# Patient Record
Sex: FEMALE | Race: WHITE | Employment: UNEMPLOYED | URBAN - NONMETROPOLITAN AREA
[De-identification: names, ages, dates, MRNs, and addresses within clinical notes are randomized per-mention and may not be internally consistent; named-entity substitution may affect disease eponyms.]

---
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
## 2022-04-10 ENCOUNTER — HOSPITAL ENCOUNTER (EMERGENCY)
Age: 22
Discharge: HOME | End: 2022-04-10
Payer: MEDICAID

## 2022-04-10 VITALS
OXYGEN SATURATION: 98 % | SYSTOLIC BLOOD PRESSURE: 99 MMHG | RESPIRATION RATE: 15 BRPM | DIASTOLIC BLOOD PRESSURE: 71 MMHG | HEART RATE: 108 BPM | TEMPERATURE: 98.4 F | WEIGHT: 97 LBS

## 2022-04-10 DIAGNOSIS — N39.0 URINARY TRACT INFECTION WITHOUT HEMATURIA, SITE UNSPECIFIED: ICD-10-CM

## 2022-04-10 DIAGNOSIS — N89.8 VAGINAL DISCHARGE: Primary | ICD-10-CM

## 2022-04-10 LAB
BACTERIA WET PREP: NORMAL
BACTERIA: NEGATIVE /HPF
BILIRUBIN URINE: NEGATIVE
BLOOD, URINE: NEGATIVE
CLARITY: ABNORMAL
CLUE CELLS: NORMAL
COLOR: ABNORMAL
CRYSTALS, UA: ABNORMAL /HPF
EPITHELIAL CELLS WET PREP: NORMAL
EPITHELIAL CELLS, UA: 1 /HPF (ref 0–5)
GLUCOSE URINE: NEGATIVE MG/DL
HCG(URINE) PREGNANCY TEST: NEGATIVE
HYALINE CASTS: 0 /HPF (ref 0–8)
KETONES, URINE: ABNORMAL MG/DL
LEUKOCYTE ESTERASE, URINE: ABNORMAL
NITRITE, URINE: NEGATIVE
PH UA: 8.5 (ref 5–8)
PROTEIN UA: ABNORMAL MG/DL
RBC UA: 4 /HPF (ref 0–4)
RBC WET PREP: NORMAL
SOURCE WET PREP: NORMAL
SPECIFIC GRAVITY UA: 1.02 (ref 1–1.03)
TRICHOMONAS PREP: NORMAL
UROBILINOGEN, URINE: 2 E.U./DL
WBC UA: 189 /HPF (ref 0–5)
WBC WET PREP: NORMAL
YEAST WET PREP: NORMAL

## 2022-04-10 PROCEDURE — 87086 URINE CULTURE/COLONY COUNT: CPT

## 2022-04-10 PROCEDURE — 6360000002 HC RX W HCPCS: Performed by: PHYSICIAN ASSISTANT

## 2022-04-10 PROCEDURE — 96372 THER/PROPH/DIAG INJ SC/IM: CPT

## 2022-04-10 PROCEDURE — 87661 TRICHOMONAS VAGINALIS AMPLIF: CPT

## 2022-04-10 PROCEDURE — 99283 EMERGENCY DEPT VISIT LOW MDM: CPT

## 2022-04-10 PROCEDURE — 81001 URINALYSIS AUTO W/SCOPE: CPT

## 2022-04-10 PROCEDURE — 84703 CHORIONIC GONADOTROPIN ASSAY: CPT

## 2022-04-10 PROCEDURE — 87591 N.GONORRHOEAE DNA AMP PROB: CPT

## 2022-04-10 PROCEDURE — 87491 CHLMYD TRACH DNA AMP PROBE: CPT

## 2022-04-10 RX ORDER — CEFTRIAXONE 1 G/1
500 INJECTION, POWDER, FOR SOLUTION INTRAMUSCULAR; INTRAVENOUS ONCE
Status: COMPLETED | OUTPATIENT
Start: 2022-04-10 | End: 2022-04-10

## 2022-04-10 RX ORDER — DOXYCYCLINE HYCLATE 100 MG
100 TABLET ORAL 2 TIMES DAILY
Qty: 14 TABLET | Refills: 0 | Status: SHIPPED | OUTPATIENT
Start: 2022-04-10 | End: 2022-04-17

## 2022-04-10 RX ADMIN — CEFTRIAXONE 500 MG: 1 INJECTION, POWDER, FOR SOLUTION INTRAMUSCULAR; INTRAVENOUS at 21:40

## 2022-04-10 NOTE — Clinical Note
Bipin Triplett Zaks was seen and treated in our emergency department on 4/10/2022. She may return to work on 04/13/2022. If you have any questions or concerns, please don't hesitate to call.       Shadi Payne

## 2022-04-11 LAB
C. TRACHOMATIS DNA ,URINE: POSITIVE
N. GONORRHOEAE DNA, URINE: NEGATIVE
TRICHOMONAS VAGINALIS DNA, URINE: POSITIVE

## 2022-04-11 ASSESSMENT — ENCOUNTER SYMPTOMS
BACK PAIN: 0
CONSTIPATION: 0
ABDOMINAL PAIN: 0
DIARRHEA: 0
VOMITING: 0
SHORTNESS OF BREATH: 0
NAUSEA: 0

## 2022-04-11 NOTE — ED PROVIDER NOTES
Patient came back positive for chlamydia and trichomonas. She is taking doxy appropriate oral outpatient for chlamydia. I have called 1 time loading dose 2000mg of flagyl for trich. She is abstaining until asymptomatic and will notify partner.        Brigette Connelly [place]  04/11/22 3165

## 2022-04-11 NOTE — ED NOTES
Pt resting comfortably at this time.   Waiting on Re-eval/possible pelvic exam.      Grace Callejas RN  04/10/22 2038

## 2022-04-11 NOTE — ED PROVIDER NOTES
[facility] EMERGENCY DEPT  eMERGENCY dEPARTMENT eNCOUnter      Pt Name: Nan Cervantes  MRN: [id number]  [place] 2000  Date of evaluation: 4/10/2022  Provider: Vitor Dickens, [address] [place] [address]       Chief Complaint   Patient presents with    Vaginal Discharge     green discharge x2 days          HISTORY OF PRESENT ILLNESS   (Location/Symptom, Timing/Onset,Context/Setting, Quality, Duration, Modifying Factors, Severity)  Note limiting factors. Nan Cervantes is a  25 y.o. female who denies medical history who presents to the emergency department with vaginal discharge over the last 2 days. She admits to a foul-smelling green discharge. She states the discharge is copious. The patient is currently sexually active with multiple partners. She denies any known pregnancy. She denies any vaginal bleeding. She does admit to dysuria that started today. She denies any associated hematuria, frequency changes, or abdominal pain. She denies any CVA or flank pain. HPI    NursingNotes were reviewed. REVIEW OF SYSTEMS    (2-9 systems for level 4, 10 or more for level 5)     Review of Systems   Constitutional: Negative for chills and fever. Respiratory: Negative for shortness of breath. Cardiovascular: Negative for chest pain. Gastrointestinal: Negative for abdominal pain, constipation, diarrhea, nausea and vomiting. Genitourinary: Positive for dysuria (started today) and vaginal discharge. Negative for flank pain, frequency, hematuria, pelvic pain, vaginal bleeding and vaginal pain. Musculoskeletal: Negative for back pain. All other systems reviewed and are negative. PAST MEDICALHISTORY   History reviewed. No pertinent past medical history. SURGICAL HISTORY     History reviewed. No pertinent surgical history. CURRENT MEDICATIONS     Discharge Medication List as of 4/10/2022 10:17 PM          ALLERGIES     Patient has no known allergies. FAMILY HISTORY     History reviewed. No pertinent family history. SOCIAL HISTORY       Social History     Socioeconomic History    Marital status: Single     Spouse name: None    Number of children: None    Years of education: None    Highest education level: None   Occupational History    None   Tobacco Use    Smoking status: Never Smoker    Smokeless tobacco: Never Used   Vaping Use    Vaping Use: Every day   Substance and Sexual Activity    Alcohol use: Not Currently    Drug use: Never    Sexual activity: Yes   Other Topics Concern    None   Social History Narrative    None     Social Determinants of Health     Financial Resource Strain:     Difficulty of Paying Living Expenses: Not on file   Food Insecurity:     Worried About Running Out of Food in the Last Year: Not on file    Jas of Food in the Last Year: Not on file   Transportation Needs:     Lack of Transportation (Medical): Not on file    Lack of Transportation (Non-Medical):  Not on file   Physical Activity:     Days of Exercise per Week: Not on file    Minutes of Exercise per Session: Not on file   Stress:     Feeling of Stress : Not on file   Social Connections:     Frequency of Communication with Friends and Family: Not on file    Frequency of Social Gatherings with Friends and Family: Not on file    Attends Rastafarian Services: Not on file    Active Member of [address] [place] [address] or Organizations: Not on file    Attends Club or Organization Meetings: Not on file    Marital Status: Not on file   Intimate Partner Violence:     Fear of Current or Ex-Partner: Not on file    Emotionally Abused: Not on file    Physically Abused: Not on file    Sexually Abused: Not on file   Housing Stability:     Unable to Pay for Housing in the Last Year: Not on file    Number of [place] in the Last Year: Not on file    Unstable Housing in the Last Year: Not on file       SCREENINGS    Windy Coma Scale  Eye Opening: Spontaneous  Best Verbal Response: Oriented  Best Motor Response: Obeys commands  Windy Coma Scale Score: 15        PHYSICAL EXAM    (up to 7 for level 4, 8 or more for level 5)     ED Triage Vitals [04/10/22 1722]   BP Temp Temp src Pulse Resp SpO2 Height Weight   99/71 98.4 °F (36.9 °C) -- 108 15 98 % -- 97 lb (44 kg)       Physical Exam  Vitals and nursing note reviewed. Exam conducted with a chaperone present (Nurse Mali Oswald). Constitutional:       General: She is not in acute distress. Appearance: Normal appearance. She is not ill-appearing or toxic-appearing. Comments: underweight   Cardiovascular:      Rate and Rhythm: Normal rate and regular rhythm. Pulses: Normal pulses. Pulmonary:      Effort: Pulmonary effort is normal. No respiratory distress. Abdominal:      General: There is no distension. Palpations: Abdomen is soft. There is no mass. Tenderness: There is no abdominal tenderness. There is no right CVA tenderness or left CVA tenderness. Genitourinary:     Exam position: Knee-chest position. Labia:         Right: No rash, tenderness, lesion or injury. Left: No rash, tenderness, lesion or injury. Urethra: No urethral pain, urethral swelling or urethral lesion. Vagina: No signs of injury and foreign body. Vaginal discharge present. No erythema, tenderness or lesions. Cervix: Discharge present. No cervical motion tenderness, friability, erythema or cervical bleeding. Uterus: Not enlarged and not tender. Adnexa: Right adnexa normal and left adnexa normal.        Right: No mass, tenderness or fullness. Left: No mass, tenderness or fullness. Comments: Copious green discharge  Skin:     General: Skin is warm and dry. Neurological:      General: No focal deficit present. Mental Status: She is alert and oriented to person, place, and time.          DIAGNOSTIC RESULTS     LABS:  Labs Reviewed   URINALYSIS WITH REFLEX TO CULTURE - Abnormal; Notable for the following components:       Result Value    Color, UA DARK YELLOW (*)     Clarity, UA CLOUDY (*)     Ketones, Urine TRACE (*)     pH, UA 8.5 (*)     Protein, UA TRACE (*)     Urobilinogen, Urine 2.0 (*)     Leukocyte Esterase, Urine LARGE (*)     All other components within normal limits   MICROSCOPIC URINALYSIS - Abnormal; Notable for the following components:    Bacteria, UA NEGATIVE (*)     Crystals, UA NEG (*)     WBC,  (*)     All other components within normal limits   WET PREP, GENITAL   CHLAMYDIA/N. GONORRHOEAE/T. VAGINALIS, AMPLIFIED PROBE(UR)   CULTURE, URINE   PREGNANCY, URINE       All other labs were within normal range or not returned as of this dictation. EMERGENCY DEPARTMENT COURSE and DIFFERENTIAL DIAGNOSIS/MDM:   Vitals:    Vitals:    04/10/22 1722   BP: 99/71   Pulse: 108   Resp: 15   Temp: 98.4 °F (36.9 °C)   SpO2: 98%   Weight: 97 lb (44 kg)       MDM  Patient is a 19-year-old female presents the ER with vaginal discharge. On physical exam, she does have green copious thin discharge. She does not have any associated abdominal pain, pain on  exam, or vaginal pain that would warrant ultrasound imaging at this time. hCG is negative. Her urinalysis does show some signs of infection and since the patient is having dysuria, the antibiotic she will be given will cover for UTI. Based on the patient having multiple partners and a suspected STI exposure, I will go ahead and treat prophylactically for both chlamydia and gonorrhea. She was given a dose of Rocephin in the ER. She also be sent home with a course of doxycycline. Her wet prep does not show any clue cells or trichomonas warranting course of Flagyl. STI is supported by a large amount of white cells on wet prep. At this time, the patient is safe to be discharged. I have stressed with her avoiding any sexual contact for 1 week and retesting if positive.   I have also stressed follow-up with OB/GYN or her primary care provider to ensure

## 2022-04-11 NOTE — ED NOTES
[facility], [address] and Claribel ojeda, RN at bedside to perform pevlic exam.  Pt tolerated well.       Davey Foley, MED  04/10/22 9126

## 2022-04-12 LAB — URINE CULTURE, ROUTINE: NORMAL

## 2022-11-09 ENCOUNTER — HOSPITAL ENCOUNTER (EMERGENCY)
Facility: HOSPITAL | Age: 22
Discharge: HOME | End: 2022-11-09
Attending: STUDENT IN AN ORGANIZED HEALTH CARE EDUCATION/TRAINING PROGRAM | Admitting: STUDENT IN AN ORGANIZED HEALTH CARE EDUCATION/TRAINING PROGRAM

## 2022-11-09 ENCOUNTER — APPOINTMENT (OUTPATIENT)
Dept: GENERAL RADIOLOGY | Facility: HOSPITAL | Age: 22
End: 2022-11-09

## 2022-11-09 VITALS
BODY MASS INDEX: 18.33 KG/M2 | WEIGHT: 110 LBS | HEART RATE: 74 BPM | HEIGHT: 65 IN | RESPIRATION RATE: 19 BRPM | DIASTOLIC BLOOD PRESSURE: 70 MMHG | OXYGEN SATURATION: 99 % | TEMPERATURE: 98.5 F | SYSTOLIC BLOOD PRESSURE: 114 MMHG

## 2022-11-09 DIAGNOSIS — M79.641 RIGHT HAND PAIN: ICD-10-CM

## 2022-11-09 DIAGNOSIS — S66.911A STRAIN OF RIGHT HAND, INITIAL ENCOUNTER: Primary | ICD-10-CM

## 2022-11-09 PROCEDURE — 73110 X-RAY EXAM OF WRIST: CPT

## 2022-11-09 PROCEDURE — 73130 X-RAY EXAM OF HAND: CPT

## 2022-11-09 PROCEDURE — 99283 EMERGENCY DEPT VISIT LOW MDM: CPT

## 2022-11-09 RX ORDER — ACETAMINOPHEN 325 MG/1
650 TABLET ORAL EVERY 6 HOURS PRN
Qty: 80 TABLET | Refills: 0 | Status: SHIPPED | OUTPATIENT
Start: 2022-11-09 | End: 2022-11-19

## 2022-11-09 RX ORDER — IBUPROFEN 400 MG/1
400 TABLET ORAL ONCE
Status: COMPLETED | OUTPATIENT
Start: 2022-11-09 | End: 2022-11-09

## 2022-11-09 RX ORDER — ACETAMINOPHEN 500 MG
1000 TABLET ORAL ONCE
Status: COMPLETED | OUTPATIENT
Start: 2022-11-09 | End: 2022-11-09

## 2022-11-09 RX ORDER — IBUPROFEN 600 MG/1
600 TABLET ORAL EVERY 6 HOURS PRN
Qty: 40 TABLET | Refills: 0 | Status: SHIPPED | OUTPATIENT
Start: 2022-11-09 | End: 2022-11-19

## 2022-11-09 RX ADMIN — IBUPROFEN 400 MG: 400 TABLET, FILM COATED ORAL at 21:09

## 2022-11-09 RX ADMIN — ACETAMINOPHEN 1000 MG: 500 TABLET ORAL at 21:08

## 2022-11-10 NOTE — DISCHARGE INSTRUCTIONS
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  Please follow-up with a primary care provider within 2 days for further management.  I listed a primary care providers with that she can follow-up.  If your symptoms worsen please return to the emergency department immediately.

## 2022-11-10 NOTE — ED PROVIDER NOTES
"EMERGENCY DEPARTMENT HISTORY AND PHYSICAL EXAM    Patient Name: Eva Amaya    Chief Complaint   Patient presents with   • Hand Pain       History of Presenting Illness:  Eva Amaya is a 22 y.o. female with no sniffing past medical history presents to the emergency department due to right hand pain.    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  She noticed some pain to her right lateral hand near the palmar surface.  No inciting trauma but she states she was frequently lifting things today and that is what makes it worse.  Denies any significant wrist pain associated.  No cuts to her hand.  Has never had similar symptoms in the past.  Is not taking any medicines for the pain.      Past Medical History:   Past Medical History:   Diagnosis Date   • Chlamydia     during this pregnancy   • Gonorrhea     during this pregnancy       Past Surgical History:   No prior surgeries    Social History:   Denies tobacco  Denies EtOH  Denies marijuana, cocaine, or IV drugs    Allergies:   No Known Allergies    Medications:   No current facility-administered medications for this encounter.    Current Outpatient Medications:   •  Prenatal Vit-Fe Fumarate-FA (prenatal vitamin 28-0.8) 28-0.8 MG tablet tablet, Take 1 tablet by mouth Daily., Disp: 30 tablet, Rfl: 12    Review of Systems:  A full review of systems was obtained and is negative unless otherwise stated in HPI.    Physical Exam:   VS: /70 (BP Location: Right arm, Patient Position: Sitting)   Pulse 85   Temp 98.5 °F (36.9 °C) (Oral)   Resp 16   Ht 165.1 cm (65\")   Wt 49.9 kg (110 lb)   LMP 10/26/2022 (Approximate)   SpO2 100%   BMI 18.30 kg/m²   GENERAL: Well-appearing woman sitting up in stretcher no acute distress; well nourished, well developed, awake, alert, no acute distress, nontoxic appearing, comfortable  MUSCULOSKELETAL/EXTREMITIES: Tenderness elicited over the fifth metatarsal mild tenderness no significant swelling; no scaphoid " tenderness; otherwise all extremities without obvious deformity, no cyanosis or clubbing  SKIN: warm and dry with no obvious rashes  NEUROLOGIC: Full motor and sensory function of the radial median and ulnar nerve territories the right hand compared to the left  PSYCHIATRIC: alert, pleasant and cooperative. Appropriate mood and affect.      Radiology:   XR Hand 3+ View Right   Final Result   No acute findings.       This report was finalized on 11/09/2022 21:15 by Dr. Denny Molina MD.      XR Wrist 3+ View Right   Final Result   No acute bony abnormality.       This report was finalized on 11/09/2022 21:15 by Dr. Denny Molina MD.            Medical Decision Making:  Eva Amaya is a 22 y.o. female with no sniffing past medical history who presents emerged department with right hand pain after recently starting a new job.    Vital signs reassuring.  Exam unremarkable.    Imaging was ordered and reviewed.  X-ray of the right hand and wrist with no acute findings.    Nursing notes were reviewed.    The patient was given Tylenol and ibuprofen for pain.    Patient's presentation is most consistent with likely musculoskeletal strain of her right hand in setting of her recent overuse in the setting of her recent starting of her job.  Her exam has no significant tenderness.  No evidence of fractures on x-ray.  No other trauma reported.    Patient was discharged home with plan to follow-up with primary care provider within 2 days for further management return the emergency department immediately symptoms worsen.  Instructed to take anti-inflammatories to help with her pain and symptoms.      ED Diagnosis:  Strain of right hand, initial encounter; Right hand pain      Disposition: to home    Follow up plan: PCP follow up within 2 days, return to ED immediately if symptoms worsen        Signed:  Wilbur Casiano MD  Emergency Medicine Physician    Please note that portions of this note were completed with a voice  recognition program.      Wilbur Casiano MD  11/09/22 [id number]

## 2023-08-20 ENCOUNTER — APPOINTMENT (OUTPATIENT)
Dept: GENERAL RADIOLOGY | Facility: HOSPITAL | Age: 23
End: 2023-08-20
Payer: MEDICAID

## 2023-08-20 ENCOUNTER — HOSPITAL ENCOUNTER (EMERGENCY)
Facility: HOSPITAL | Age: 23
Discharge: HOME | End: 2023-08-20
Attending: FAMILY MEDICINE | Admitting: FAMILY MEDICINE
Payer: MEDICAID

## 2023-08-20 VITALS
OXYGEN SATURATION: 99 % | BODY MASS INDEX: 17.33 KG/M2 | HEIGHT: 65 IN | TEMPERATURE: 100 F | DIASTOLIC BLOOD PRESSURE: 60 MMHG | WEIGHT: 104 LBS | SYSTOLIC BLOOD PRESSURE: 99 MMHG | RESPIRATION RATE: 18 BRPM | HEART RATE: 93 BPM

## 2023-08-20 DIAGNOSIS — U07.1 COVID-19: Primary | ICD-10-CM

## 2023-08-20 LAB
FLUAV RNA RESP QL NAA+PROBE: NOT DETECTED
FLUBV RNA RESP QL NAA+PROBE: NOT DETECTED
SARS-COV-2 RNA RESP QL NAA+PROBE: DETECTED

## 2023-08-20 PROCEDURE — 87636 SARSCOV2 & INF A&B AMP PRB: CPT | Performed by: FAMILY MEDICINE

## 2023-08-20 PROCEDURE — 71046 X-RAY EXAM CHEST 2 VIEWS: CPT

## 2023-08-20 PROCEDURE — 99283 EMERGENCY DEPT VISIT LOW MDM: CPT

## 2023-08-20 RX ORDER — ACETAMINOPHEN 500 MG
1000 TABLET ORAL ONCE
Status: COMPLETED | OUTPATIENT
Start: 2023-08-20 | End: 2023-08-20

## 2023-08-20 RX ORDER — IBUPROFEN 800 MG/1
800 TABLET ORAL ONCE
Status: COMPLETED | OUTPATIENT
Start: 2023-08-20 | End: 2023-08-20

## 2023-08-20 RX ADMIN — IBUPROFEN 800 MG: 800 TABLET, FILM COATED ORAL at 16:47

## 2023-08-20 RX ADMIN — ACETAMINOPHEN 1000 MG: 500 TABLET, FILM COATED ORAL at 16:47

## 2023-08-20 NOTE — ED PROVIDER NOTES
Subjective   History of Present Illness  23-year-old female comes in with feeling like she has had a fever since last night.  Patient also has cough and congestion.  Patient has no shortness of breath.  Patient has no body aches.  Patient has no nausea vomiting or diarrhea.  Patient denies any other symptoms at this time.    Review of Systems   Constitutional:  Positive for fever.   HENT:  Positive for congestion.    Respiratory:  Positive for cough.    All other systems reviewed and are negative.    Past Medical History:   Diagnosis Date    Chlamydia     during this pregnancy    Gonorrhea     during this pregnancy       No Known Allergies    History reviewed. No pertinent surgical history.    Family History   Problem Relation Age of Onset    Lung disease Mother     No Known Problems Father     Lung disease Brother     Kidney failure Maternal Grandmother     Cancer Maternal Grandfather     Diabetes Paternal Grandmother     Lung disease Paternal Grandfather        Social History     Socioeconomic History    Marital status: Single   Tobacco Use    Smoking status: Never    Smokeless tobacco: Never   Vaping Use    Vaping Use: Former   Substance and Sexual Activity    Alcohol use: No    Drug use: No    Sexual activity: Never           Objective   Physical Exam  Vitals and nursing note reviewed.   Constitutional:       Appearance: Normal appearance.   HENT:      Head: Normocephalic and atraumatic.      Mouth/Throat:      Mouth: Mucous membranes are moist.   Eyes:      Extraocular Movements: Extraocular movements intact.      Pupils: Pupils are equal, round, and reactive to light.   Cardiovascular:      Rate and Rhythm: Normal rate and regular rhythm.      Heart sounds: Normal heart sounds.   Pulmonary:      Effort: Pulmonary effort is normal.      Breath sounds: Normal breath sounds.   Skin:     General: Skin is warm and dry.   Neurological:      General: No focal deficit present.      Mental Status: She is alert and  oriented to person, place, and time.   Psychiatric:         Mood and Affect: Mood normal.         Behavior: Behavior normal.       Procedures           ED Course                                         Lab Results (last 24 hours)       Procedure Component Value Units Date/Time    COVID-19 and FLU A/B PCR - Swab, Nasopharynx [[id number]]  (Abnormal) Collected: 08/20/23 1649    Specimen: Swab from Nasopharynx Updated: 08/20/23 1756     COVID19 Detected     Influenza A PCR Not Detected     Influenza B PCR Not Detected    Narrative:      Fact sheet for providers: https://www.fda.gov/media/446952/download    Fact sheet for patients: https://www.fda.gov/media/837892/download    Test performed by PCR.            XR Chest 2 View   Final Result   1. No radiographic evidence of acute cardiopulmonary process. No   visualized infiltrate.   This report was finalized on 08/20/2023 17:02 by Dr Carloz Thomas, .          Medical Decision Making  23-year-old female presents emergency room with fever, cough, congestion.  Patient has tested positive for COVID-19.  Patient is in no respiratory distress.  Patient's x-ray was negative for any cardiopulmonary process.  Patient be discharged home in stable condition.  Return precautions were given to the patient.  All questions were answered for the patient prior to discharge.  Patient was advised to return to the emergency room with new or worsening symptoms.  Patient verbalized understanding was agreeable plan as discussed.    Problems Addressed:  COVID-19: complicated acute illness or injury    Amount and/or Complexity of Data Reviewed  Radiology: ordered. Decision-making details documented in ED Course.    Risk  OTC drugs.  Prescription drug management.        Final diagnoses:   COVID-19       ED Disposition  ED Disposition       ED Disposition   Discharge    Condition   Stable    Comment   --               PATIENT CONNECTION - [facility] [id number]  [phone]        [facility]  [facility] EMERGENCY DEPARTMENT  [address] [place] [id number] [phone]    As needed, If symptoms worsen         Medication List      No changes were made to your prescriptions during this visit.            Adi Florez MD  08/20/23 9014

## 2023-11-20 ENCOUNTER — HOSPITAL ENCOUNTER (EMERGENCY)
Facility: HOSPITAL | Age: 23
Discharge: HOME | End: 2023-11-20
Admitting: EMERGENCY MEDICINE
Payer: MEDICAID

## 2023-11-20 VITALS
OXYGEN SATURATION: 100 % | BODY MASS INDEX: 18.74 KG/M2 | HEART RATE: 60 BPM | WEIGHT: 112.5 LBS | SYSTOLIC BLOOD PRESSURE: 105 MMHG | HEIGHT: 65 IN | DIASTOLIC BLOOD PRESSURE: 59 MMHG | RESPIRATION RATE: 18 BRPM | TEMPERATURE: 97.8 F

## 2023-11-20 DIAGNOSIS — K02.9 PAIN DUE TO DENTAL CARIES: Primary | ICD-10-CM

## 2023-11-20 PROCEDURE — 99282 EMERGENCY DEPT VISIT SF MDM: CPT

## 2023-11-20 PROCEDURE — 96372 THER/PROPH/DIAG INJ SC/IM: CPT

## 2023-11-20 PROCEDURE — 25010000002 KETOROLAC TROMETHAMINE PER 15 MG: Performed by: EMERGENCY MEDICINE

## 2023-11-20 PROCEDURE — 25010000002 DEXAMETHASONE PER 1 MG

## 2023-11-20 RX ORDER — AMOXICILLIN AND CLAVULANATE POTASSIUM 875; 125 MG/1; MG/1
1 TABLET, FILM COATED ORAL 2 TIMES DAILY
Qty: 20 TABLET | Refills: 0 | Status: SHIPPED | OUTPATIENT
Start: 2023-11-20 | End: 2023-11-20

## 2023-11-20 RX ORDER — KETOROLAC TROMETHAMINE 15 MG/ML
15 INJECTION, SOLUTION INTRAMUSCULAR; INTRAVENOUS ONCE
Status: DISCONTINUED | OUTPATIENT
Start: 2023-11-20 | End: 2023-11-20

## 2023-11-20 RX ORDER — KETOROLAC TROMETHAMINE 10 MG/1
10 TABLET, FILM COATED ORAL EVERY 6 HOURS PRN
Qty: 20 TABLET | Refills: 0 | Status: SHIPPED | OUTPATIENT
Start: 2023-11-20 | End: 2023-11-25

## 2023-11-20 RX ORDER — KETOROLAC TROMETHAMINE 10 MG/1
10 TABLET, FILM COATED ORAL EVERY 6 HOURS PRN
Qty: 20 TABLET | Refills: 0 | Status: SHIPPED | OUTPATIENT
Start: 2023-11-20 | End: 2023-11-20

## 2023-11-20 RX ORDER — AMOXICILLIN AND CLAVULANATE POTASSIUM 875; 125 MG/1; MG/1
1 TABLET, FILM COATED ORAL 2 TIMES DAILY
Qty: 20 TABLET | Refills: 0 | Status: SHIPPED | OUTPATIENT
Start: 2023-11-20 | End: 2023-11-30

## 2023-11-20 RX ORDER — KETOROLAC TROMETHAMINE 15 MG/ML
15 INJECTION, SOLUTION INTRAMUSCULAR; INTRAVENOUS ONCE
Status: COMPLETED | OUTPATIENT
Start: 2023-11-20 | End: 2023-11-20

## 2023-11-20 RX ORDER — DEXAMETHASONE SODIUM PHOSPHATE 10 MG/ML
10 INJECTION INTRAMUSCULAR; INTRAVENOUS ONCE
Status: COMPLETED | OUTPATIENT
Start: 2023-11-20 | End: 2023-11-20

## 2023-11-20 RX ADMIN — KETOROLAC TROMETHAMINE 15 MG: 15 INJECTION, SOLUTION INTRAMUSCULAR; INTRAVENOUS at 19:35

## 2023-11-20 RX ADMIN — DEXAMETHASONE SODIUM PHOSPHATE 10 MG: 10 INJECTION INTRAMUSCULAR; INTRAVENOUS at 19:34

## 2023-11-21 NOTE — DISCHARGE INSTRUCTIONS
It was very nice to meet you, Eva. Thank you for allowing us to take care of you today at [facility].    Today you were seen in the emergency department for your symptoms. Please understand that an ER evaluation is just the start of your evaluation. We do the best we can, but we are often unable to fully find what is causing your symptoms from one evaluation.  Because of this, the goal is to determine whether you need to be evaluated in the hospital or if it is safe for you to go home and see other doctors provided such as primary care physicians or specialist on an outpatient basis.     Like we discussed, I strongly urge that you follow up with your primary care doctor. Please call their office to set up an appointment as soon as possible so that you can be re-evaluated for improvement in your symptoms or for any other questions.  I have provided the primary care provider information needed, including phone number, to call to set up an appointment below in these discharge papers.     Educational material has also been provided in the following pages regarding what we have discussed today.     MEDICATIONS PRESCRIBED: Augmentin twice a day for the next 10 days. Toradol as needed for pain relief. OTC benzocaine spray.    Please return to the emergency room within 12-48 hours if you experience symptoms such as the following:   Fever, chills, chest pain or shortness of breath, pain with inspiration/expiration, pain that travels to your arms, neck or back, nausea, vomiting, severe headache, tearing pain in your chest, dizziness, feel as though you are about to pass out, OR if you have any worsening symptoms, or any other concerns.

## 2023-11-21 NOTE — ED PROVIDER NOTES
"Subjective   History of Present Illness  Patient is a 23-year-old female that presents to the emergency department for dental pain.  Patient reports she has had multiple dental issues since 2020 when she delivered her son due to \"him taking all of her vitamins out of her body \".  She states that she has not tried to seek treatment at a dentist before.  She states that she has been experiencing an increase in pain to the left lower back of her mouth has been ongoing for the last 5 days.  She states that she began noticing some minor swelling over the last 2 days to the left side of her face.  Patient denies any fevers, body aches, or chills.  She denies any shortness of breath, or difficulty swallowing.       Review of Systems   HENT:  Positive for dental problem and facial swelling.    All other systems reviewed and are negative.      Past Medical History:   Diagnosis Date    Chlamydia     during this pregnancy    Gonorrhea     during this pregnancy       No Known Allergies    History reviewed. No pertinent surgical history.    Family History   Problem Relation Age of Onset    Lung disease Mother     No Known Problems Father     Lung disease Brother     Kidney failure Maternal Grandmother     Cancer Maternal Grandfather     Diabetes Paternal Grandmother     Lung disease Paternal Grandfather        Social History     Socioeconomic History    Marital status: Single   Tobacco Use    Smoking status: Never    Smokeless tobacco: Never   Vaping Use    Vaping Use: Former   Substance and Sexual Activity    Alcohol use: No    Drug use: No    Sexual activity: Never           Objective   Physical Exam  Vitals and nursing note reviewed.   Constitutional:       Appearance: Normal appearance.      Comments: Nontoxic appearing. In no acute distress.    HENT:      Head: Normocephalic and atraumatic.      Jaw: There is normal jaw occlusion.      Right Ear: External ear normal.      Left Ear: External ear normal.      Nose: Nose " normal.      Mouth/Throat:      Lips: Pink.      Mouth: Mucous membranes are moist.      Dentition: Dental tenderness, dental caries and dental abscesses present.      Pharynx: Oropharynx is clear. Uvula midline. No pharyngeal swelling or posterior oropharyngeal erythema.      Tonsils: No tonsillar exudate or tonsillar abscesses.      Comments: Multiple dental caries noted throughout upper and lower mouth.  Patient does have localized small amount of swelling noted to the left jaw.  No erythema noted.  The affected area is not warm to touch.  Eyes:      Extraocular Movements: Extraocular movements intact.      Conjunctiva/sclera: Conjunctivae normal.      Pupils: Pupils are equal, round, and reactive to light.   Cardiovascular:      Rate and Rhythm: Normal rate and regular rhythm.      Pulses: Normal pulses.      Heart sounds: Normal heart sounds.   Pulmonary:      Effort: Pulmonary effort is normal. No respiratory distress.      Breath sounds: Normal breath sounds. No wheezing.   Chest:      Chest wall: No tenderness.   Abdominal:      General: Abdomen is flat. Bowel sounds are normal. There is no distension.      Palpations: Abdomen is soft.      Tenderness: There is no abdominal tenderness. There is no right CVA tenderness, left CVA tenderness, guarding or rebound.   Musculoskeletal:         General: Normal range of motion.      Cervical back: Normal range of motion and neck supple.      Right lower leg: No edema.      Left lower leg: No edema.   Skin:     General: Skin is warm and dry.      Capillary Refill: Capillary refill takes less than 2 seconds.   Neurological:      General: No focal deficit present.      Mental Status: She is alert and oriented to person, place, and time. Mental status is at baseline.   Psychiatric:         Mood and Affect: Mood normal.         Behavior: Behavior normal.         Thought Content: Thought content normal.         Judgment: Judgment normal.         Procedures           ED  "Course                                           Medical Decision Making  Eva Amaya is a 23 y.o. female who presents to the ED for dental pain.  Patient reports she has had multiple dental issues since 2020 when she delivered her son due to \"him taking all of her vitamins out of her body \".  She states that she has not tried to seek treatment at a dentist before.  She states that she has been experiencing an increase in pain to the left lower back of her mouth has been ongoing for the last 5 days.  She states that she began noticing some minor swelling over the last 2 days to the left side of her face.  Patient denies any fevers, body aches, or chills.  She denies any shortness of breath, or difficulty swallowing.     Patient was non-toxic appearing on arrival. No acute distress was noted.  Vital signs stable.     Past medical history, surgical history, and medication regimen reviewed.     Patient's presentation raises suspicion for differentials including, but not limited to, dental fracture, dental abscess, dental caries.    Medications  dexAMETHasone (DECADRON) injection 10 mg (10 mg Intramuscular Given 11/20/23 1934)  ketorolac (TORADOL) injection 15 mg (15 mg Intramuscular Given 11/20/23 1935)     Given findings described above, patient's presentation is likely consistent with dental abscess.    I had an in-depth discussion with the patient regarding physical exam findings.  We discussed that patient ultimately will need to see a dentist.  Patient was discharged with prescription for Augmentin and Toradol.  Also discussed the dental clinics at [facility] and [facility].  Also discussed Tylenol and/or Motrin as needed for pain relief and fever. I answered all the questions regarding the emergency department evaluation, diagnosis, and treatment plan in plain and simple language that was understandable. We discussed that due to always having some diagnostic uncertainty while in the ER, there " is always a chance that symptoms may change or new symptoms may reveal themselves after being discharged. Because of this, I stressed the importance of Eva following up with their dentist. Patient informed that appointment will need to be done by calling their office to set up an appointment within the next few days or as soon as reasonably possible so that the symptoms can be re-evaluated for improvement or for any other questions. I also gave Eva common sense return precautions and prompted patient to return to the emergency department within 24 - 48hrs if there are any new, worsening, or concerning symptoms. The patient verbalized understanding of the discharge instructions and agreed with them. Eva was discharged in stable condition.     Dragon disclaimer:  Parts of this note may be an electronic transcription/translation of spoken language to printed text using the Dragon dictation system.       Problems Addressed:  Pain due to dental caries: complicated acute illness or injury    Risk  Prescription drug management.        Final diagnoses:   Pain due to dental caries       ED Disposition  ED Disposition       ED Disposition   Discharge    Condition   Stable    Comment   --               [facility]  [address] [facility] [address] [phone]        [facility] EMERGENCY DEPARTMENT  [address] [place] [id number] [phone]    If symptoms worsen         Medication List        New Prescriptions      amoxicillin-clavulanate 875-125 MG per tablet  Commonly known as: AUGMENTIN  Take 1 tablet by mouth 2 (Two) Times a Day for 10 days.     ketorolac 10 MG tablet  Commonly known as: TORADOL  Take 1 tablet by mouth Every 6 (Six) Hours As Needed for Moderate Pain for up to 5 days.               Where to Get Your Medications        These medications were sent to [facility]/pharmacy #[id number] - [place], [place] - [address] AT ACROSS FROM [address] -  [phone]  - [phone] FX  [address] [facility] [address]      Phone: [phone]   amoxicillin-clavulanate 875-125 MG per tablet  ketorolac 10 MG tablet            Herbert Cordon, APRN  11/20/23 1945

## 2023-12-26 NOTE — PATIENT INSTRUCTIONS
Called and left detailed message per HIPPA Pt and mom advised she has allergies and sinus infection with headache, tenderness over frontal and maxillary sinus.  Instructed on meds purpose, dosage, and frequency.  She should stay on the claritin until summer time.  Pt and mom advised she needs to learn how to swallow pill since she is getting weight of an adult.  If not better in 3 to 4 days follow up here or pcp or urgent.  I advised pt and mom the claritin 5 mg/1 tsp NDC [id number] Lot # [id number] EXP 11/2018 2 tsp given today for allergies and he should not take claritin today from drug store until tomorrow and Ibuprofen NDC [id number] Lot # [id number] Exp 10/2018 for headache she should be able to make it through school and not have to go home.  Pt sent back to class with return to class excuse.

## 2024-05-28 ENCOUNTER — APPOINTMENT (OUTPATIENT)
Dept: ULTRASOUND IMAGING | Age: 24
DRG: 833 | End: 2024-05-28
Payer: MEDICAID

## 2024-05-28 ENCOUNTER — HOSPITAL ENCOUNTER (INPATIENT)
Age: 24
LOS: 1 days | Discharge: LEFT BEFORE DISCHARGE | DRG: 833 | End: 2024-05-28
Attending: OBSTETRICS & GYNECOLOGY | Admitting: OBSTETRICS & GYNECOLOGY
Payer: MEDICAID

## 2024-05-28 VITALS
RESPIRATION RATE: 18 BRPM | DIASTOLIC BLOOD PRESSURE: 60 MMHG | OXYGEN SATURATION: 100 % | TEMPERATURE: 97.3 F | HEART RATE: 76 BPM | SYSTOLIC BLOOD PRESSURE: 109 MMHG

## 2024-05-28 PROBLEM — O36.4XX0 FETAL DEMISE AFFECTING DELIVERY: Status: ACTIVE | Noted: 2024-05-28

## 2024-05-28 PROBLEM — K08.9 POOR DENTITION: Status: ACTIVE | Noted: 2024-05-28

## 2024-05-28 PROBLEM — O99.012 ANEMIA DURING PREGNANCY IN SECOND TRIMESTER: Status: ACTIVE | Noted: 2024-05-28

## 2024-05-28 LAB
ABO/RH: NORMAL
AMPHET UR QL SCN: NEGATIVE
ANTIBODY SCREEN: NORMAL
APTT PPP: 27.7 SEC (ref 26–36.2)
BARBITURATES UR QL SCN: NEGATIVE
BENZODIAZ UR QL SCN: NEGATIVE
BUPRENORPHINE URINE: NEGATIVE
CANNABINOIDS UR QL SCN: NEGATIVE
COCAINE UR QL SCN: NEGATIVE
DRUG SCREEN COMMENT UR-IMP: NORMAL
ERYTHROCYTE [DISTWIDTH] IN BLOOD BY AUTOMATED COUNT: 15.2 % (ref 11.5–14.5)
FENTANYL SCREEN, URINE: NEGATIVE
FETAL RBC/2000 RBC NFR BLD KLEIH BETKE: 0 %
FIBRINOGEN PPP-MCNC: 527 MG/DL (ref 238–505)
HBA1C MFR BLD: 4.8 % (ref 4–6)
HCT VFR BLD AUTO: 25.5 % (ref 37–47)
HGB BLD-MCNC: 7.3 G/DL (ref 12–16)
HIV-1 P24 AG: NORMAL
HIV1+2 AB SERPLBLD QL IA.RAPID: NORMAL
INR PPP: 1.02 (ref 0.88–1.18)
MCH RBC QN AUTO: 23.2 PG (ref 27–31)
MCHC RBC AUTO-ENTMCNC: 28.6 G/DL (ref 33–37)
MCV RBC AUTO: 81 FL (ref 81–99)
METHADONE UR QL SCN: NEGATIVE
METHAMPHETAMINE, URINE: NEGATIVE
OPIATES UR QL SCN: NEGATIVE
OXYCODONE UR QL SCN: NEGATIVE
PCP UR QL SCN: NEGATIVE
PLATELET # BLD AUTO: 235 K/UL (ref 130–400)
PMV BLD AUTO: 12.2 FL (ref 9.4–12.3)
PROTHROMBIN TIME: 13.1 SEC (ref 12–14.6)
RBC # BLD AUTO: 3.15 M/UL (ref 4.2–5.4)
RUBV IGG SER-ACNC: REACTIVE [IU]/ML
TRICYCLIC ANTIDEPRESSANTS, UR: NEGATIVE
TSH SERPL DL<=0.005 MIU/L-ACNC: 5.39 UIU/ML (ref 0.27–4.2)
WBC # BLD AUTO: 5.7 K/UL (ref 4.8–10.8)

## 2024-05-28 PROCEDURE — 1220000000 HC SEMI PRIVATE OB R&B

## 2024-05-28 PROCEDURE — 87806 HIV AG W/HIV1&2 ANTB W/OPTIC: CPT

## 2024-05-28 PROCEDURE — 85305 CLOT INHIBIT PROT S TOTAL: CPT

## 2024-05-28 PROCEDURE — 84443 ASSAY THYROID STIM HORMONE: CPT

## 2024-05-28 PROCEDURE — 83036 HEMOGLOBIN GLYCOSYLATED A1C: CPT

## 2024-05-28 PROCEDURE — 4500000002 HC ER NO CHARGE

## 2024-05-28 PROCEDURE — 86900 BLOOD TYPING SEROLOGIC ABO: CPT

## 2024-05-28 PROCEDURE — 86147 CARDIOLIPIN ANTIBODY EA IG: CPT

## 2024-05-28 PROCEDURE — 86644 CMV ANTIBODY: CPT

## 2024-05-28 PROCEDURE — 85730 THROMBOPLASTIN TIME PARTIAL: CPT

## 2024-05-28 PROCEDURE — 86778 TOXOPLASMA ANTIBODY IGM: CPT

## 2024-05-28 PROCEDURE — 76815 OB US LIMITED FETUS(S): CPT

## 2024-05-28 PROCEDURE — 85220 BLOOC CLOT FACTOR V TEST: CPT

## 2024-05-28 PROCEDURE — 86694 HERPES SIMPLEX NES ANTBDY: CPT

## 2024-05-28 PROCEDURE — 86762 RUBELLA ANTIBODY: CPT

## 2024-05-28 PROCEDURE — 85613 RUSSELL VIPER VENOM DILUTED: CPT

## 2024-05-28 PROCEDURE — 86850 RBC ANTIBODY SCREEN: CPT

## 2024-05-28 PROCEDURE — 86777 TOXOPLASMA ANTIBODY: CPT

## 2024-05-28 PROCEDURE — 85210 CLOT FACTOR II PROTHROM SPEC: CPT

## 2024-05-28 PROCEDURE — 86645 CMV ANTIBODY IGM: CPT

## 2024-05-28 PROCEDURE — 36415 COLL VENOUS BLD VENIPUNCTURE: CPT

## 2024-05-28 PROCEDURE — 85384 FIBRINOGEN ACTIVITY: CPT

## 2024-05-28 PROCEDURE — 85027 COMPLETE CBC AUTOMATED: CPT

## 2024-05-28 PROCEDURE — G0480 DRUG TEST DEF 1-7 CLASSES: HCPCS

## 2024-05-28 PROCEDURE — 86592 SYPHILIS TEST NON-TREP QUAL: CPT

## 2024-05-28 PROCEDURE — 80307 DRUG TEST PRSMV CHEM ANLYZR: CPT

## 2024-05-28 PROCEDURE — 86747 PARVOVIRUS ANTIBODY: CPT

## 2024-05-28 PROCEDURE — 86901 BLOOD TYPING SEROLOGIC RH(D): CPT

## 2024-05-28 PROCEDURE — 85460 HEMOGLOBIN FETAL: CPT

## 2024-05-28 PROCEDURE — 99223 1ST HOSP IP/OBS HIGH 75: CPT | Performed by: OBSTETRICS & GYNECOLOGY

## 2024-05-28 PROCEDURE — 85610 PROTHROMBIN TIME: CPT

## 2024-05-28 PROCEDURE — 85302 CLOT INHIBIT PROT C ANTIGEN: CPT

## 2024-05-28 RX ORDER — SODIUM CHLORIDE, SODIUM LACTATE, POTASSIUM CHLORIDE, CALCIUM CHLORIDE 600; 310; 30; 20 MG/100ML; MG/100ML; MG/100ML; MG/100ML
INJECTION, SOLUTION INTRAVENOUS CONTINUOUS
Status: DISCONTINUED | OUTPATIENT
Start: 2024-05-28 | End: 2024-05-28 | Stop reason: HOSPADM

## 2024-05-28 RX ORDER — ONDANSETRON 2 MG/ML
4 INJECTION INTRAMUSCULAR; INTRAVENOUS EVERY 6 HOURS PRN
Status: DISCONTINUED | OUTPATIENT
Start: 2024-05-28 | End: 2024-05-28 | Stop reason: HOSPADM

## 2024-05-28 RX ORDER — SODIUM CHLORIDE 0.9 % (FLUSH) 0.9 %
5-40 SYRINGE (ML) INJECTION EVERY 12 HOURS SCHEDULED
Status: DISCONTINUED | OUTPATIENT
Start: 2024-05-28 | End: 2024-05-28 | Stop reason: HOSPADM

## 2024-05-28 RX ORDER — SODIUM CHLORIDE 0.9 % (FLUSH) 0.9 %
5-40 SYRINGE (ML) INJECTION PRN
Status: DISCONTINUED | OUTPATIENT
Start: 2024-05-28 | End: 2024-05-28 | Stop reason: HOSPADM

## 2024-05-28 RX ORDER — ACETAMINOPHEN 500 MG
1000 TABLET ORAL EVERY 8 HOURS SCHEDULED
Status: DISCONTINUED | OUTPATIENT
Start: 2024-05-28 | End: 2024-05-28 | Stop reason: HOSPADM

## 2024-05-28 RX ORDER — SODIUM CHLORIDE 9 MG/ML
25 INJECTION, SOLUTION INTRAVENOUS PRN
Status: DISCONTINUED | OUTPATIENT
Start: 2024-05-28 | End: 2024-05-28 | Stop reason: HOSPADM

## 2024-05-28 ASSESSMENT — ENCOUNTER SYMPTOMS
GASTROINTESTINAL NEGATIVE: 1
EYES NEGATIVE: 1
RESPIRATORY NEGATIVE: 1
ALLERGIC/IMMUNOLOGIC NEGATIVE: 1

## 2024-05-28 NOTE — H&P
HISTORY AND PHYSICAL             Date: 2024        Patient Name: Estelle Teixeira     YOB: 2000      Age:  23 y.o.    Chief Complaint     Chief Complaint   Patient presents with    fetal demise      ”absent fetal movement”    History Obtained From   patient    History of Present Illness   Estelle is a 23 yho  who presents with complaint of no fetal movement x 2 days.  She presented to [place] for appointment and there were absent fetal heart tones on doppler and no activity on US.  She denies vaginal bleeding, leakage of fluid or pain.    Past Medical History   No past medical history on file.     Past Surgical History   No past surgical history on file.     Medications Prior to Admission     Prior to Admission medications    Medication Sig Start Date End Date Taking? Authorizing Provider   Prenatal Vit-Fe Fumarate-FA (PRENATAL VITAMIN PO) Take 1 tablet by mouth daily   Yes Provider, MD Ayla        Allergies   Patient has no known allergies.    Social History     Social History       Tobacco History       Smoking Status  Never      Smokeless Tobacco Use  Never              Alcohol History       Alcohol Use Status  Not Currently              Drug Use       Drug Use Status  Never              Sexual Activity       Sexually Active  Yes                    Family History   No family history on file.    Review of Systems   Review of Systems   Constitutional: Negative.    HENT: Negative.     Eyes: Negative.    Respiratory: Negative.     Cardiovascular: Negative.    Gastrointestinal: Negative.    Endocrine: Negative.    Genitourinary: Negative.    Musculoskeletal: Negative.    Skin: Negative.    Allergic/Immunologic: Negative.    Neurological: Negative.    Hematological: Negative.    Psychiatric/Behavioral: Negative.         Physical Exam   /60   Pulse 76   Temp 97.3 °F (36.3 °C) (Temporal)   Resp 18   SpO2 100%     Physical Exam  Constitutional:       General: She is not in acute

## 2024-05-28 NOTE — FLOWSHEET NOTE
Pt updated with request for patient to receive IV iron, pt refused and stated \"I just want to go home and I will come back later\" Dr Shaikh updated

## 2024-05-28 NOTE — CARE COORDINATION
Update: HOLLIE Foster was unable to provide Pt with additional community resources due to Pt signed out AMA. Electronically signed by Tierney Menezes on 5/28/2024 at 2:10 PM

## 2024-05-28 NOTE — CARE COORDINATION
Update: HOLLIE Foster met with Pt at bedside to discuss any needs/concerns at this time, Pt currently does not at this time. Electronically signed by Tierney Menezes on 5/28/2024 at 12:16 PM

## 2024-05-29 LAB
APTT SCREEN TO CONFIRM RATIO: 0.96 {RATIO}
B19V IGG SER IA-ACNC: 5.55 IV
B19V IGM SER IA-ACNC: 0.2 IV
CARDIOLIPIN IGA SER IA-ACNC: <10 APL
CONFIRM DRVVT STA-STACLOT: NORMAL S
DRVVT SCREEN TO CONFIRM RATIO: 0.83 {RATIO}
DRVVT SCREEN TO CONFIRM RATIO: NORMAL {RATIO}
DRVVT/DRVVT CFM P DOAC NEUT NORM PPP-RTO: NORMAL {RATIO}
HEPARIN NT PPP QL: NORMAL
HSV1+2 IGG SER IA-ACNC: >22.4 IV
HSV1+2 IGM SER IA-ACNC: 0.69 IV
LA 3 SCREEN W REFLEX-IMP: NORMAL
LMW HEPARIN IND PLT AB SER QL: NORMAL
MIXING DRVVT: NORMAL S
PROT C AG ACT/NOR PPP IA: 88 % (ref 63–153)
PROT S AG ACT/NOR PPP IA: 69 % (ref 63–126)
PROTHROMBIN TIME: 12.5 S (ref 12–15.5)
SCREEN APTT: NORMAL S
THROMBIN TIME: NORMAL S

## 2024-05-30 LAB
CMV IGG SERPL IA-ACNC: 0.49 U/ML
CMV IGM SERPL IA-ACNC: <8 AU/ML
FACT V ACT/NOR PPP: 103 % (ref 62–140)
PROTHROM ACT/NOR PPP: 120 % (ref 86–150)
RPR SER QL: NORMAL
RUBV IGM SER IA-ACNC: <10 AU/ML
T GONDII IGG SER-ACNC: <3 IU/ML
T GONDII IGM SER-ACNC: 4.9 AU/ML

## 2024-06-26 ENCOUNTER — ANESTHESIA EVENT (OUTPATIENT)
Dept: OPERATING ROOM | Age: 24
End: 2024-06-26
Payer: MEDICAID

## 2024-06-26 ENCOUNTER — HOSPITAL ENCOUNTER (INPATIENT)
Age: 24
LOS: 1 days | Discharge: HOME | End: 2024-06-27
Attending: OBSTETRICS & GYNECOLOGY | Admitting: OBSTETRICS & GYNECOLOGY
Payer: MEDICAID

## 2024-06-26 ENCOUNTER — ANESTHESIA (OUTPATIENT)
Dept: OPERATING ROOM | Age: 24
End: 2024-06-26
Payer: MEDICAID

## 2024-06-26 PROBLEM — O36.4XX1 FETAL DEMISE, GREATER THAN 22 WEEKS, ANTEPARTUM, FETUS 1: Status: ACTIVE | Noted: 2024-06-26

## 2024-06-26 LAB
ABO + RH BLD: NORMAL
APTT PPP: 38 SEC (ref 26–36.2)
BLD GP AB SCN SERPL QL: NORMAL
ERYTHROCYTE [DISTWIDTH] IN BLOOD BY AUTOMATED COUNT: 16.1 % (ref 11.5–14.5)
FIBRINOGEN PPP-MCNC: 398 MG/DL (ref 238–505)
HCT VFR BLD AUTO: 37.3 % (ref 37–47)
HGB BLD-MCNC: 9.6 G/DL (ref 12–16)
MCH RBC QN AUTO: 23.2 PG (ref 27–31)
MCHC RBC AUTO-ENTMCNC: 25.7 G/DL (ref 33–37)
MCV RBC AUTO: 90.1 FL (ref 81–99)
PLATELET # BLD AUTO: 242 K/UL (ref 130–400)
PMV BLD AUTO: 12.3 FL (ref 9.4–12.3)
RBC # BLD AUTO: 4.14 M/UL (ref 4.2–5.4)
WBC # BLD AUTO: 8 K/UL (ref 4.8–10.8)

## 2024-06-26 PROCEDURE — 2580000003 HC RX 258: Performed by: OBSTETRICS & GYNECOLOGY

## 2024-06-26 PROCEDURE — 86900 BLOOD TYPING SEROLOGIC ABO: CPT

## 2024-06-26 PROCEDURE — 86901 BLOOD TYPING SEROLOGIC RH(D): CPT

## 2024-06-26 PROCEDURE — 36415 COLL VENOUS BLD VENIPUNCTURE: CPT

## 2024-06-26 PROCEDURE — 7100000001 HC PACU RECOVERY - ADDTL 15 MIN: Performed by: OBSTETRICS & GYNECOLOGY

## 2024-06-26 PROCEDURE — 88307 TISSUE EXAM BY PATHOLOGIST: CPT

## 2024-06-26 PROCEDURE — 3600000014 HC SURGERY LEVEL 4 ADDTL 15MIN: Performed by: OBSTETRICS & GYNECOLOGY

## 2024-06-26 PROCEDURE — 6360000002 HC RX W HCPCS: Performed by: NURSE ANESTHETIST, CERTIFIED REGISTERED

## 2024-06-26 PROCEDURE — 2500000003 HC RX 250 WO HCPCS: Performed by: NURSE ANESTHETIST, CERTIFIED REGISTERED

## 2024-06-26 PROCEDURE — 85384 FIBRINOGEN ACTIVITY: CPT

## 2024-06-26 PROCEDURE — 6360000002 HC RX W HCPCS: Performed by: OBSTETRICS & GYNECOLOGY

## 2024-06-26 PROCEDURE — 6370000000 HC RX 637 (ALT 250 FOR IP): Performed by: OBSTETRICS & GYNECOLOGY

## 2024-06-26 PROCEDURE — 7100000000 HC PACU RECOVERY - FIRST 15 MIN: Performed by: OBSTETRICS & GYNECOLOGY

## 2024-06-26 PROCEDURE — 85730 THROMBOPLASTIN TIME PARTIAL: CPT

## 2024-06-26 PROCEDURE — 85027 COMPLETE CBC AUTOMATED: CPT

## 2024-06-26 PROCEDURE — 2580000003 HC RX 258: Performed by: NURSE ANESTHETIST, CERTIFIED REGISTERED

## 2024-06-26 PROCEDURE — 2709999900 HC NON-CHARGEABLE SUPPLY: Performed by: OBSTETRICS & GYNECOLOGY

## 2024-06-26 PROCEDURE — 3700000000 HC ANESTHESIA ATTENDED CARE: Performed by: OBSTETRICS & GYNECOLOGY

## 2024-06-26 PROCEDURE — 10D17ZZ EXTRACTION OF PRODUCTS OF CONCEPTION, RETAINED, VIA NATURAL OR ARTIFICIAL OPENING: ICD-10-PCS | Performed by: OBSTETRICS & GYNECOLOGY

## 2024-06-26 PROCEDURE — 3600000004 HC SURGERY LEVEL 4 BASE: Performed by: OBSTETRICS & GYNECOLOGY

## 2024-06-26 PROCEDURE — 86850 RBC ANTIBODY SCREEN: CPT

## 2024-06-26 PROCEDURE — 1220000000 HC SEMI PRIVATE OB R&B

## 2024-06-26 PROCEDURE — 3700000001 HC ADD 15 MINUTES (ANESTHESIA): Performed by: OBSTETRICS & GYNECOLOGY

## 2024-06-26 RX ORDER — SODIUM CHLORIDE 9 MG/ML
INJECTION, SOLUTION INTRAVENOUS PRN
Status: DISCONTINUED | OUTPATIENT
Start: 2024-06-26 | End: 2024-06-26 | Stop reason: HOSPADM

## 2024-06-26 RX ORDER — PROPOFOL 10 MG/ML
INJECTION, EMULSION INTRAVENOUS PRN
Status: DISCONTINUED | OUTPATIENT
Start: 2024-06-26 | End: 2024-06-26 | Stop reason: SDUPTHER

## 2024-06-26 RX ORDER — HYDROMORPHONE HYDROCHLORIDE 1 MG/ML
0.5 INJECTION, SOLUTION INTRAMUSCULAR; INTRAVENOUS; SUBCUTANEOUS EVERY 5 MIN PRN
Status: DISCONTINUED | OUTPATIENT
Start: 2024-06-26 | End: 2024-06-26 | Stop reason: HOSPADM

## 2024-06-26 RX ORDER — SODIUM CHLORIDE, SODIUM LACTATE, POTASSIUM CHLORIDE, CALCIUM CHLORIDE 600; 310; 30; 20 MG/100ML; MG/100ML; MG/100ML; MG/100ML
INJECTION, SOLUTION INTRAVENOUS CONTINUOUS PRN
Status: DISCONTINUED | OUTPATIENT
Start: 2024-06-26 | End: 2024-06-26 | Stop reason: SDUPTHER

## 2024-06-26 RX ORDER — SODIUM CHLORIDE 0.9 % (FLUSH) 0.9 %
5-40 SYRINGE (ML) INJECTION PRN
Status: DISCONTINUED | OUTPATIENT
Start: 2024-06-26 | End: 2024-06-26 | Stop reason: HOSPADM

## 2024-06-26 RX ORDER — DOCUSATE SODIUM 100 MG/1
100 CAPSULE, LIQUID FILLED ORAL 2 TIMES DAILY
Status: DISCONTINUED | OUTPATIENT
Start: 2024-06-26 | End: 2024-06-27 | Stop reason: HOSPADM

## 2024-06-26 RX ORDER — SODIUM CHLORIDE, SODIUM LACTATE, POTASSIUM CHLORIDE, CALCIUM CHLORIDE 600; 310; 30; 20 MG/100ML; MG/100ML; MG/100ML; MG/100ML
INJECTION, SOLUTION INTRAVENOUS CONTINUOUS
Status: DISCONTINUED | OUTPATIENT
Start: 2024-06-26 | End: 2024-06-26 | Stop reason: HOSPADM

## 2024-06-26 RX ORDER — ONDANSETRON 2 MG/ML
INJECTION INTRAMUSCULAR; INTRAVENOUS PRN
Status: DISCONTINUED | OUTPATIENT
Start: 2024-06-26 | End: 2024-06-26 | Stop reason: SDUPTHER

## 2024-06-26 RX ORDER — SODIUM CHLORIDE 9 MG/ML
INJECTION, SOLUTION INTRAVENOUS PRN
Status: DISCONTINUED | OUTPATIENT
Start: 2024-06-26 | End: 2024-06-27 | Stop reason: HOSPADM

## 2024-06-26 RX ORDER — MISOPROSTOL 200 UG/1
600 TABLET ORAL ONCE
Status: COMPLETED | OUTPATIENT
Start: 2024-06-26 | End: 2024-06-26

## 2024-06-26 RX ORDER — CARBOPROST TROMETHAMINE 250 UG/ML
250 INJECTION, SOLUTION INTRAMUSCULAR PRN
Status: DISCONTINUED | OUTPATIENT
Start: 2024-06-26 | End: 2024-06-27 | Stop reason: HOSPADM

## 2024-06-26 RX ORDER — FENTANYL CITRATE 50 UG/ML
25 INJECTION, SOLUTION INTRAMUSCULAR; INTRAVENOUS EVERY 5 MIN PRN
Status: DISCONTINUED | OUTPATIENT
Start: 2024-06-26 | End: 2024-06-26 | Stop reason: HOSPADM

## 2024-06-26 RX ORDER — KETOROLAC TROMETHAMINE 30 MG/ML
30 INJECTION, SOLUTION INTRAMUSCULAR; INTRAVENOUS ONCE
Status: COMPLETED | OUTPATIENT
Start: 2024-06-26 | End: 2024-06-26

## 2024-06-26 RX ORDER — TRANEXAMIC ACID 10 MG/ML
1000 INJECTION, SOLUTION INTRAVENOUS
Status: ACTIVE | OUTPATIENT
Start: 2024-06-26 | End: 2024-06-27

## 2024-06-26 RX ORDER — SODIUM CHLORIDE 0.9 % (FLUSH) 0.9 %
5-40 SYRINGE (ML) INJECTION EVERY 12 HOURS SCHEDULED
Status: DISCONTINUED | OUTPATIENT
Start: 2024-06-26 | End: 2024-06-26 | Stop reason: HOSPADM

## 2024-06-26 RX ORDER — ONDANSETRON 2 MG/ML
4 INJECTION INTRAMUSCULAR; INTRAVENOUS
Status: DISCONTINUED | OUTPATIENT
Start: 2024-06-26 | End: 2024-06-26 | Stop reason: HOSPADM

## 2024-06-26 RX ORDER — ACETAMINOPHEN 500 MG
1000 TABLET ORAL EVERY 8 HOURS
Status: DISCONTINUED | OUTPATIENT
Start: 2024-06-26 | End: 2024-06-26 | Stop reason: HOSPADM

## 2024-06-26 RX ORDER — ONDANSETRON 4 MG/1
4 TABLET, ORALLY DISINTEGRATING ORAL EVERY 6 HOURS PRN
Status: DISCONTINUED | OUTPATIENT
Start: 2024-06-26 | End: 2024-06-27 | Stop reason: HOSPADM

## 2024-06-26 RX ORDER — LIDOCAINE HYDROCHLORIDE 10 MG/ML
INJECTION, SOLUTION EPIDURAL; INFILTRATION; INTRACAUDAL; PERINEURAL PRN
Status: DISCONTINUED | OUTPATIENT
Start: 2024-06-26 | End: 2024-06-26 | Stop reason: SDUPTHER

## 2024-06-26 RX ORDER — SODIUM CHLORIDE 9 MG/ML
25 INJECTION, SOLUTION INTRAVENOUS PRN
Status: DISCONTINUED | OUTPATIENT
Start: 2024-06-26 | End: 2024-06-26 | Stop reason: HOSPADM

## 2024-06-26 RX ORDER — ROCURONIUM BROMIDE 10 MG/ML
INJECTION, SOLUTION INTRAVENOUS PRN
Status: DISCONTINUED | OUTPATIENT
Start: 2024-06-26 | End: 2024-06-26 | Stop reason: SDUPTHER

## 2024-06-26 RX ORDER — MIDAZOLAM HYDROCHLORIDE 1 MG/ML
INJECTION INTRAMUSCULAR; INTRAVENOUS PRN
Status: DISCONTINUED | OUTPATIENT
Start: 2024-06-26 | End: 2024-06-26 | Stop reason: SDUPTHER

## 2024-06-26 RX ORDER — CEFAZOLIN SODIUM 1 G/3ML
INJECTION, POWDER, FOR SOLUTION INTRAMUSCULAR; INTRAVENOUS PRN
Status: DISCONTINUED | OUTPATIENT
Start: 2024-06-26 | End: 2024-06-26 | Stop reason: SDUPTHER

## 2024-06-26 RX ORDER — SODIUM CHLORIDE 0.9 % (FLUSH) 0.9 %
5-40 SYRINGE (ML) INJECTION PRN
Status: DISCONTINUED | OUTPATIENT
Start: 2024-06-26 | End: 2024-06-27 | Stop reason: HOSPADM

## 2024-06-26 RX ORDER — ONDANSETRON 2 MG/ML
4 INJECTION INTRAMUSCULAR; INTRAVENOUS EVERY 6 HOURS PRN
Status: DISCONTINUED | OUTPATIENT
Start: 2024-06-26 | End: 2024-06-26 | Stop reason: HOSPADM

## 2024-06-26 RX ORDER — MISOPROSTOL 200 UG/1
400 TABLET ORAL PRN
Status: DISCONTINUED | OUTPATIENT
Start: 2024-06-26 | End: 2024-06-27 | Stop reason: HOSPADM

## 2024-06-26 RX ORDER — ACETAMINOPHEN 500 MG
1000 TABLET ORAL EVERY 8 HOURS
Status: DISCONTINUED | OUTPATIENT
Start: 2024-06-26 | End: 2024-06-27 | Stop reason: HOSPADM

## 2024-06-26 RX ORDER — FENTANYL CITRATE 50 UG/ML
INJECTION, SOLUTION INTRAMUSCULAR; INTRAVENOUS PRN
Status: DISCONTINUED | OUTPATIENT
Start: 2024-06-26 | End: 2024-06-26 | Stop reason: SDUPTHER

## 2024-06-26 RX ORDER — ONDANSETRON 2 MG/ML
4 INJECTION INTRAMUSCULAR; INTRAVENOUS EVERY 6 HOURS PRN
Status: DISCONTINUED | OUTPATIENT
Start: 2024-06-26 | End: 2024-06-27 | Stop reason: HOSPADM

## 2024-06-26 RX ORDER — DEXAMETHASONE SODIUM PHOSPHATE 10 MG/ML
INJECTION, SOLUTION INTRAMUSCULAR; INTRAVENOUS PRN
Status: DISCONTINUED | OUTPATIENT
Start: 2024-06-26 | End: 2024-06-26 | Stop reason: SDUPTHER

## 2024-06-26 RX ORDER — SODIUM CHLORIDE 0.9 % (FLUSH) 0.9 %
5-40 SYRINGE (ML) INJECTION EVERY 12 HOURS SCHEDULED
Status: DISCONTINUED | OUTPATIENT
Start: 2024-06-26 | End: 2024-06-27 | Stop reason: HOSPADM

## 2024-06-26 RX ORDER — NALOXONE HYDROCHLORIDE 0.4 MG/ML
INJECTION, SOLUTION INTRAMUSCULAR; INTRAVENOUS; SUBCUTANEOUS PRN
Status: DISCONTINUED | OUTPATIENT
Start: 2024-06-26 | End: 2024-06-26 | Stop reason: HOSPADM

## 2024-06-26 RX ORDER — IBUPROFEN 400 MG/1
800 TABLET ORAL EVERY 8 HOURS
Status: DISCONTINUED | OUTPATIENT
Start: 2024-06-26 | End: 2024-06-27 | Stop reason: HOSPADM

## 2024-06-26 RX ORDER — METHYLERGONOVINE MALEATE 0.2 MG/ML
200 INJECTION INTRAVENOUS PRN
Status: DISCONTINUED | OUTPATIENT
Start: 2024-06-26 | End: 2024-06-27 | Stop reason: HOSPADM

## 2024-06-26 RX ADMIN — CEFAZOLIN 1 G: 1 INJECTION, POWDER, FOR SOLUTION INTRAMUSCULAR; INTRAVENOUS at 15:21

## 2024-06-26 RX ADMIN — PROPOFOL 200 MG: 10 INJECTION, EMULSION INTRAVENOUS at 15:15

## 2024-06-26 RX ADMIN — Medication 500 ML: at 11:29

## 2024-06-26 RX ADMIN — FENTANYL CITRATE 50 MCG: 0.05 INJECTION, SOLUTION INTRAMUSCULAR; INTRAVENOUS at 15:27

## 2024-06-26 RX ADMIN — KETOROLAC TROMETHAMINE 30 MG: 30 INJECTION, SOLUTION INTRAMUSCULAR at 11:20

## 2024-06-26 RX ADMIN — MISOPROSTOL 600 MCG: 200 TABLET ORAL at 12:15

## 2024-06-26 RX ADMIN — FENTANYL CITRATE 50 MCG: 0.05 INJECTION, SOLUTION INTRAMUSCULAR; INTRAVENOUS at 15:13

## 2024-06-26 RX ADMIN — DEXAMETHASONE SODIUM PHOSPHATE 10 MG: 10 INJECTION, SOLUTION INTRAMUSCULAR; INTRAVENOUS at 15:24

## 2024-06-26 RX ADMIN — LIDOCAINE HYDROCHLORIDE 50 MG: 10 INJECTION, SOLUTION EPIDURAL; INFILTRATION; INTRACAUDAL; PERINEURAL at 15:15

## 2024-06-26 RX ADMIN — SUGAMMADEX 300 MG: 100 INJECTION, SOLUTION INTRAVENOUS at 15:30

## 2024-06-26 RX ADMIN — SODIUM CHLORIDE, SODIUM LACTATE, POTASSIUM CHLORIDE, AND CALCIUM CHLORIDE: 600; 310; 30; 20 INJECTION, SOLUTION INTRAVENOUS at 15:11

## 2024-06-26 RX ADMIN — FERRIC CARBOXYMALTOSE INJECTION 750 MG: 50 INJECTION, SOLUTION INTRAVENOUS at 18:12

## 2024-06-26 RX ADMIN — ROCURONIUM BROMIDE 70 MG: 10 INJECTION, SOLUTION INTRAVENOUS at 15:15

## 2024-06-26 RX ADMIN — ONDANSETRON 4 MG: 2 INJECTION INTRAMUSCULAR; INTRAVENOUS at 15:24

## 2024-06-26 RX ADMIN — MIDAZOLAM 2 MG: 1 INJECTION INTRAMUSCULAR; INTRAVENOUS at 15:11

## 2024-06-26 SDOH — ECONOMIC STABILITY: FOOD INSECURITY: WITHIN THE PAST 12 MONTHS, YOU WORRIED THAT YOUR FOOD WOULD RUN OUT BEFORE YOU GOT MONEY TO BUY MORE.: NEVER TRUE

## 2024-06-26 SDOH — ECONOMIC STABILITY: INCOME INSECURITY: IN THE PAST 12 MONTHS, HAS THE ELECTRIC, GAS, OIL, OR WATER COMPANY THREATENED TO SHUT OFF SERVICE IN YOUR HOME?: NO

## 2024-06-26 SDOH — ECONOMIC STABILITY: INCOME INSECURITY: HOW HARD IS IT FOR YOU TO PAY FOR THE VERY BASICS LIKE FOOD, HOUSING, MEDICAL CARE, AND HEATING?: NOT HARD AT ALL

## 2024-06-26 ASSESSMENT — PAIN SCALES - GENERAL: PAINLEVEL_OUTOF10: 10

## 2024-06-26 ASSESSMENT — PATIENT HEALTH QUESTIONNAIRE - PHQ9
SUM OF ALL RESPONSES TO PHQ QUESTIONS 1-9: 4
SUM OF ALL RESPONSES TO PHQ QUESTIONS 1-9: 4
SUM OF ALL RESPONSES TO PHQ9 QUESTIONS 1 & 2: 4
2. FEELING DOWN, DEPRESSED OR HOPELESS: MORE THAN HALF THE DAYS
SUM OF ALL RESPONSES TO PHQ QUESTIONS 1-9: 4
SUM OF ALL RESPONSES TO PHQ QUESTIONS 1-9: 4
1. LITTLE INTEREST OR PLEASURE IN DOING THINGS: MORE THAN HALF THE DAYS

## 2024-06-26 NOTE — L&D DELIVERY NOTE
Puneet, Baby Pending Estelle BAEZA [[id number]]      Labor Events     Labor: Yes   Steroids: None  Cervical Ripening Date/Time:      Antibiotics Received during Labor: No  Rupture Date/Time:  24 11:10:00   Rupture Type: SROM  Fluid Color: Other (Comment)  Fluid Odor: Foul  Fluid Volume: Moderate  Induction: None  Augmentation: None  Labor Complications: Other (Comment)   Additional Complications:  OB: DELIVERY - COMPLICATIONS   Fetal demise affecting delivery             Anesthesia    Method: None       Labor Event Times      Labor onset date/time:  24 06:00:00     Dilation complete date/time:        Start pushing date/time:  2024 11:10:00   Decision date/time (emergent ):            Delivery Details      Delivery Date: 24 Delivery Time: 11:24:00   Delivery Type: Vaginal, Breech  Breech Type: Double footling               Presentation    Presentation: Breech  _: Sacrum       Shoulder Dystocia    Shoulder Dystocia Present?: No       Assisted Delivery Details    Forceps Attempted?: No  Vacuum Extractor Attempted?: No                           Cord    Vessels: 3 Vessels  Complications: Wrapped  Cord Around: Left Lower Extremity, Right Lower Extremity  Delayed Cord Clamping?: No  Cord Clamped Date/Time: 2024 11:26:00  Gases Sent?: No              Placenta           Lacerations    Episiotomy: None  Perineal Lacerations: None  Other Lacerations: no non-perineal laceration  Number of Repair Packets: 0       Blood Loss  Mother: Estelle Teixeira #[id number]     Start of Mother's Information      Delivery Blood Loss  24 0600 - 24 1546      None                 End of Mother's Information  Mother: Estelle Teixeira #[id number]                Delivery Providers    Delivering clinician: Judith Thorpe, MED     Provider Role     Obstetrician    Trang Turner RN Primary Nurse    Judith Thorpe RN Primary [place] Nurse     NICU Nurse     Neonatologist     Anesthesiologist     Nurse

## 2024-06-26 NOTE — FLOWSHEET NOTE
Patient arrived to labor and delivery via wheelchair from ER with c/o contractions that started around 6 am today. This RN and JANIYA Thorpe RN at bedside. Pt denies LOF, vaginal bleeding. Patient was at this facility as of 5/28/24 with confirmed fetal demise and left AMA at that time. TOCO applied with ctxs every 2 minutes. VS obtained. Dr. Lagos notified of pt arrival. IV inserted at bedside. At 1110 SROM occurred. Dr. Lagos notified of SROM and pain medication request. Orders received, see MAR. Infant delivered at 1124. Cord clamped and cut. Placenta still attached. Pitocin started per Dr. Lagos. Dr. Lagos notified of delivery and placenta status. Dr. Lagos to see patient at bedside.

## 2024-06-26 NOTE — ANESTHESIA POSTPROCEDURE EVALUATION
Department of Anesthesiology  Postprocedure Note    Patient: Estelle Teixeira  MRN: [id number]  YOB: 2000  Date of evaluation: 6/26/2024    Procedure Summary       Date: 06/26/24 Room / Location: [address]    Anesthesia Start: 1511 Anesthesia Stop: 1550    Procedure: extraction of placenta Diagnosis:       Retained placenta, unspecified portion of placenta      (Retained placenta, unspecified portion of placenta [O73.0])    Surgeons: Ryan Lagos MD Responsible Provider: Rubén Del Rio APRN - CRNA    Anesthesia Type: general ASA Status: 2            Anesthesia Type: No value filed.    Ashley Phase I: Ashley Score: 10    Ashley Phase II:      Anesthesia Post Evaluation    Patient location during evaluation: PACU  Patient participation: complete - patient participated  Level of consciousness: sleepy but conscious  Pain score: 0  Airway patency: patent  Nausea & Vomiting: no nausea and no vomiting  Cardiovascular status: hemodynamically stable  Respiratory status: acceptable  Hydration status: stable  Pain management: adequate    No notable events documented.

## 2024-06-26 NOTE — H&P
History and Physical    Patient's Name/Date of Birth: Estelle Teixeira / 2000, (24 y.o.), female    Date: 2024     Chief Complaint: fetal demise    HPI:   25 yo  at 27 weeks, known fetal demise 4 weeks ago, came reporting labor pain. Prenatal care is unremarkable,   Denies any uti, uri cns or covid symptoms,     No past medical history on file.    No past surgical history on file.    Current Facility-Administered Medications   Medication Dose Route Frequency Provider Last Rate Last Admin    lactated ringers IV soln infusion   IntraVENous Continuous Ryan Lagos MD        sodium chloride flush 0.9 % injection 5-40 mL  5-40 mL IntraVENous 2 times per day Ryan Lagos MD        sodium chloride flush 0.9 % injection 5-40 mL  5-40 mL IntraVENous PRN Ryan Lagos MD        0.9 % sodium chloride infusion  25 mL IntraVENous PRN Ryan Lagos MD        ondansetron (ZOFRAN) injection 4 mg  4 mg IntraVENous Q6H PRN Ryan Lagos MD        methylergonovine (METHERGINE) injection 200 mcg  200 mcg IntraMUSCular PRN Ryan Lagos MD        carboprost (HEMABATE) injection 250 mcg  250 mcg IntraMUSCular PRN Ryan Lagos MD        miSOPROStol (CYTOTEC) tablet 400 mcg  400 mcg Buccal PRN Ryan Lagos MD        tranexamic acid-NaCl IVPB premix 1,000 mg  1,000 mg IntraVENous Once PRN Ryan Lagos MD        acetaminophen (TYLENOL) tablet 1,000 mg  1,000 mg Oral q8h Ryan Lagos MD        oxytocin (PITOCIN) 30 units in 500 mL infusion  87.3 igor-units/min IntraVENous Continuous PRN Ryan Lagos MD           No Known Allergies    No family history on file.    Social History     Socioeconomic History    Marital status: Single     Spouse name: Not on file    Number of children: Not on file    Years of education: Not on file    Highest education level: Not on file   Occupational History    Not on file   Tobacco Use    Smoking status: Never    Smokeless tobacco: Never   Vaping Use

## 2024-06-26 NOTE — FLOWSHEET NOTE
LORY Menezes notified of pts SDOH answers.  spoke to patient via phone call with this RN at bedside. The patient denied seeing psych and also denied medication for anxiety or depression when asked by social work. The patient denied SI/HI/ self-harm thoughts or behaviors when asked. Social work to provide information on mental health and financial aid.

## 2024-06-26 NOTE — FLOWSHEET NOTE
OR staff at bedside to take patient to main OR for D&C procedure. Patient taken off unit accompanied by OR staff via stretcher.

## 2024-06-26 NOTE — FLOWSHEET NOTE
[facility] notified of fetal demise. Per Sandra Eaton from [facility], the case is ruled out for donation. Case number is #[id number].

## 2024-06-26 NOTE — OP NOTE
Department of Obstetrics and Gynecology   Brief Operative Report        Pre-operative Diagnosis:  Retained placenta, unspecified portion of placenta [O73.0]    Post-operative Diagnosis:  S/p placental exraction and D&C    Procedure: Procedure(s):  extraction of placenta    Surgeon:  Ryan Lagos MD     Assistant(s): Scrub Person First: Malaika Barbour    Anesthesia:  General    Findings: placenta was removed intact,  minimal tissue after curattage with 20 mm curette , ebl 300 mls    Estimated blood loss:  300 mls     Specimens:   ID Type Source Tests Collected by Time Destination   A : placenta Tissue Placenta SURGICAL PATHOLOGY Ryan Lagos MD 6/26/2024 1531        Complications:  none    Condition:  stable    Ryan Lagos MD  6/26/2024  3:37 PM      See dictated operative report for full details.

## 2024-06-26 NOTE — ANESTHESIA PRE PROCEDURE
Department of Anesthesiology  Preprocedure Note       Name:  Estelle Teixeira   Age:  24 y.o.  :  2000                                          MRN:  [id number]         Date:  2024      Surgeon: Surgeon(s):  Ryan Lagos MD    Procedure: Procedure(s):  DILATATION AND CURETTAGE SUCTION    Medications prior to admission:   Prior to Admission medications    Medication Sig Start Date End Date Taking? Authorizing Provider   Prenatal Vit-Fe Fumarate-FA (PRENATAL VITAMIN PO) Take 1 tablet by mouth daily    Provider, MD Ayla       Current medications:    Current Facility-Administered Medications   Medication Dose Route Frequency Provider Last Rate Last Admin   • lactated ringers IV soln infusion   IntraVENous Continuous Ryan Lagos MD       • sodium chloride flush 0.9 % injection 5-40 mL  5-40 mL IntraVENous 2 times per day Ryan Lagos MD       • sodium chloride flush 0.9 % injection 5-40 mL  5-40 mL IntraVENous PRN Ryan Lagos MD       • 0.9 % sodium chloride infusion  25 mL IntraVENous PRN Ryan Lagos MD       • ondansetron (ZOFRAN) injection 4 mg  4 mg IntraVENous Q6H PRN Ryan Lagos MD       • methylergonovine (METHERGINE) injection 200 mcg  200 mcg IntraMUSCular PRN Ryan Lagos MD       • carboprost (HEMABATE) injection 250 mcg  250 mcg IntraMUSCular PRN Ryan Lagos MD       • miSOPROStol (CYTOTEC) tablet 400 mcg  400 mcg Buccal PRN Ryan Lagos MD       • tranexamic acid-NaCl IVPB premix 1,000 mg  1,000 mg IntraVENous Once PRN Ryan Lagos MD       • acetaminophen (TYLENOL) tablet 1,000 mg  1,000 mg Oral q8h Ryan Lagos MD       • oxytocin (PITOCIN) 30 units in 500 mL infusion  87.3 igor-units/min IntraVENous Continuous PRN Ryan Lagos MD       • lactated ringers IV soln infusion   IntraVENous Continuous Liv Baker MD           Allergies:  No Known Allergies    Problem List:    Patient Active Problem List   Diagnosis Code   • IUFD at 20 weeks

## 2024-06-26 NOTE — DISCHARGE INSTRUCTIONS
Postpartum Discharge Teaching    Call Doctor:  1. If temp 100.4 or greater.  2. If foul smelling discharge.  3. If discharge changes from pink or yellow, back to red, and bleeding is heavier than a normal period.  4. If you pass large clots.   5. If abdominal incision starts to separate and/or starts to bleeding, is red and warm to touch or has drainage with a foul odor.  6. Report any pain, redness, or warmth of skin in calf of leg which could indicate a blood clot.    Crampin. Cramping is normal; uterus is returning to pre-pregnant state.  2. For relief, place pillow under abdomen and lie on it to apply pressure. Walking may help.    Discharge:   1. Discharge will be dark for first few days (similar to menstrual flow). It will turn to pinkish brown after 2-3 days and creamy yellow for an additional week or two. Moderate flow-use of 4-8 pads daily.  2. Small clots are normal.    Episiotomy Care:  1. May use sitz bath 3-4 times daily.  2. Use analgesic foams or sprays or medicine as ordered.   3. Keep perineal area clean.  4. Continue to use terrell bottle after urinating and gently pat from front to back.  5. Stitches will dissolve on their own. If you have stitches, shower only for 2-4 weeks or as directed by your doctor to allow suture line to heal properly. No baths, hot tubs or swimming pools until told it is alright to do so by your doctor.    Abdominal Incision Care:  1. Leave open to air after original dressing is removed.   2. Clean incision with soapy water unless otherwise directed.    Return of Periods:  1. You should resume menstruating anywhere from 6-8 weeks after birth.    Breast:  1. ENGORGEMENT  Wear supportive, well-fitting bra for two weeks, day and night.  May apply ice packs for 20 minutes 4 times daily.  Avoid breast stimulation.  Avoid heat, especially when showering. This will encourage milk letdown.    2. DO SELF-BREAST EXAMS MONTHLY.    Urine Patterns:  Report inability to urinate,  [place], [place] [address]  [phone]  GoodRx  Website: https://www.goodrx.Socset.  5.178.935.0677  NeedyMeds  Website: https://www.needymeds.org/  0.287.429.8585  RxAssist  Website: https://www.rxassist.org/  0.472.101.4972  [employer]  Website: https://www.panfoundation.org  5.403.919.6582   Single Care  www.singlecare.Socset.     134.858.5135  [employer] Perks  www.perks.optum.Socset.    712.470.0053  Many drug manufacturers offer prescription assistance programs on their website.    Assistance with Medical Expenses:  [employer] Financial Assistance [phone]  What they offer: The [employer] Financial Assistance Program helps uninsured patients who do not qualify for government-sponsored health insurance and cannot afford to pay for their medical care. Insured patients may also qualify for assistance based on family income, family size, and medical needs.     How to apply for the [facility] [employer] Financial Assistance Program (FAP):  Option 1: To apply for financial assistance, a patient (or their family or other provider) should fill out the Financial Assistance Application. Copies of the Financial Assistance Application and the FAP may be obtained for free by calling the [facility] Customer Service Department at [phone].  Option 2: The Financial Assistance Application and policy may be obtained for free by downloading a copy from the [employer] website:   https://www.YoungCurrent/patient-resources/financial-assistance     [facility] Financial Counselor [phone]  [facility] [place] [address] [place] [address]    [employer] [phone]  [address] [address]  9 AM - 3:30?PM   Monday - Friday    [facility] [phone]  [address] [address]  9 AM - 3:30?PM Monday - Thursday  9 AM - 12 PM Friday    [facility] [phone]  [address] [place] [address]  8 AM - 4:30?PM Monday - Friday    [facility] (502)

## 2024-06-26 NOTE — FLOWSHEET NOTE
Dr. Lagos at bedside. Per Dr. Lagos, patient may be discharged home at 9 p.m. if she is able to walk, urinate, and move around.

## 2024-06-27 VITALS
TEMPERATURE: 98.1 F | BODY MASS INDEX: 16.14 KG/M2 | RESPIRATION RATE: 16 BRPM | HEART RATE: 59 BPM | SYSTOLIC BLOOD PRESSURE: 103 MMHG | HEIGHT: 65 IN | OXYGEN SATURATION: 99 % | DIASTOLIC BLOOD PRESSURE: 57 MMHG

## 2024-06-27 PROBLEM — O36.4XX0 IUFD AT 20 WEEKS OR MORE OF GESTATION: Status: RESOLVED | Noted: 2024-05-28 | Resolved: 2024-06-27

## 2024-06-27 PROBLEM — O99.012 ANEMIA DURING PREGNANCY IN SECOND TRIMESTER: Status: RESOLVED | Noted: 2024-05-28 | Resolved: 2024-06-27

## 2024-06-27 PROCEDURE — 7200000001 HC VAGINAL DELIVERY

## 2024-06-27 RX ORDER — IBUPROFEN 800 MG/1
800 TABLET ORAL 2 TIMES DAILY PRN
Qty: 60 TABLET | Refills: 0 | Status: SHIPPED | OUTPATIENT
Start: 2024-06-27

## 2024-06-27 NOTE — PROGRESS NOTES
CLINICAL PHARMACY NOTE: MEDS TO BEDS    Total # of Prescriptions Filled: 1   The following medications were delivered to the patient:  Current Discharge Medication List        START taking these medications    Details   ibuprofen (ADVIL;MOTRIN) 800 MG tablet Take 1 tablet by mouth 2 times daily as needed for Pain  Qty: 60 tablet, Refills: 0               Additional Documentation:  Delivered to patients room and handed to patient. No copay

## 2024-06-27 NOTE — PROGRESS NOTES
06/27/24 1144   Encounter Summary   Encounter Overview/Reason Grief, Loss, and Adjustments   Service Provided For Family  (the mother)   Complexity of Encounter Moderate   Begin Time 1120   End Time  1130   Total Time Calculated 10 min   Spiritual/Emotional needs   Type Spiritual Support     The patient provided her phone number and the mother phone number for communication regarding baby burial. Patient was not able to answer the call,  talked with the mother and provided the time and place of the burial. Assured 's continue support for the family. Mother appreciated spiritual support.    Electronically signed by Chaplain Ale Martínez on 6/27/2024 at 11:50 AM

## 2024-06-27 NOTE — PROGRESS NOTES
Postpartum vaginal delivery  Fetal demise 27 weeks  Reports no problems  Received iron yesterday  Exam unremarkable  A: stable  Appears grieving well  Plan discharge home, counseling started

## 2024-06-27 NOTE — CARE COORDINATION
Consult: HOLLIE Foster met with Pt at bedside to discuss needs/concerns. This writer discussed vocational school (local location for GED) at [facility] [employer]  (per Pt request) in [place], [place] and provided information on the GED process for the Pt to enroll. Pt declined for this writer to set this program up for her she reported she would call to set it up herself. Pt also inquired on dental inforamtion. Pt reported she has spoken to the [facility] that an extract her upper teeth for her. This writer discussed her current insurance with Medicaid does cover for teeth extractions and for her to talk with them for dentures or implant services provided by her insurance. Pt declined needing this writer to schedule her an appointment for the extraction of her upper teeth at the [facility], Pt reported she would contact them once she has been D/C. Pt did request a follow up appointment with a mental health provider, Pt chose [facility] and would also need medication management appointment. This writer discussed Pt current grief and overwhelming emotions. Pt currently denies having any SI, HI, or any self harming behaviors at this time. Pt was polite and cooperative with this writer. Pt expressed her appreciation for staff and all the help they have provided to her during this time. PT will need to bring a picture ID and her insurance car to her appointment at [facility] on Tuesday July 2nd, 2024 10:00 AM.  This writer provided an update to Pt Nurse, Anna.     [facility] [address] [place] [address]  Phone [phone]  Hours- 8:00 AM-5:00 PM     Electronically signed by Tierney Menezes on 6/27/2024 at 11:13 AM

## 2024-06-27 NOTE — FLOWSHEET NOTE
Discharge teaching completed. All questions and concerns addressed and answered.     Patient given resources from Kristin BROWNE    Pt ambulated independently

## 2024-06-27 NOTE — PROGRESS NOTES
06/27/24 1045   Encounter Summary   Encounter Overview/Reason Grief, Loss, and Adjustments   Service Provided For Patient  (in the OB)   Referral/Consult From Other   (Chaplain Ramos)   Support System Parent   Complexity of Encounter High   Begin Time 0900   End Time  1052   Total Time Calculated 112 min   Crisis   Type Trauma   Spiritual/Emotional needs   Type Spiritual Distress   Rituals, Rites and Sacraments   Type Blessings   Grief, Loss, and Adjustments   Type Death  (fetal demise)   Assessment/Intervention/Outcome   Assessment Complicated grieving;Coping;Sad   Intervention Active listening;Discussed belief system/Sabianist practices/priscila;Grief Care   Outcome Expressed feelings, needs, and concerns   Plan and Referrals   Plan/Referrals Continue Support (comment)     Chaplain Ramos sent this referral from the OB. Email of the OB communication noted. Meting with nursing staff, Anna, patient's attending nurse introduced this  at patient's bedside. 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 Estelle wished to have baby burial next weekend (the first week of July). Patient reported \"I am not depressed. 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 Said her other son (4-year-old) will attend the burial service.     Updated nurse staff in the OB, HOLLIE Foster included, the outcome of the visit. natasha Chappell RN provided the length of the fetal. The writer related this information to Chaplain Ramos for the purpose of preparing for the burial.     This  confirmed with Allyson Justyn at [facility] and set the time for the burial to be:    Friday, 07/05/24 at 9 AM    Electronically signed by Chaplain Ale Martínez on 6/27/2024 at 11:19 AM

## 2024-06-27 NOTE — DISCHARGE SUMMARY
Obstetric Discharge Summary    Patient Name: Estelle Teixeira  Patient : 2000  Room/Bed: 0225/0225-01  Primary Care Physician: No primary care provider on file.  Admit Date: 2024 10:49 AM  MRN #: [id number]  [facility] #: [id number]    Admitting Diagnosis  Estelle Teixeira is a 24 y.o. female  at 32w4d  Admitting DX: fetal demise 28 weeks, labor  OB History          1    Para   1    Term           1    AB        Living             SAB        IAB        Ectopic        Molar        Multiple   0    Live Births                  Patient Active Problem List   Diagnosis    Poor dentition    Fetal demise, greater than 22 weeks, antepartum, fetus 1         Reasons for Admission on 2024 10:49 AM  Fetal demise, greater than 22 weeks, antepartum, fetus 1 [O36.4XX1]  No comment available  Onset of Labor    Prenatal Procedures  None    Intrapartum Procedures:  Spontaneous Vaginal Delivery: See Labor and Delivery Summary        Multiple birth?: No             Postpartum Procedures  None  Signs and symptoms of depression reviewed and evaluated: absent    Postpartum/Operative Complications:  none       Data  Information for the patient's :  Puneet, Baby Pending Estelle BAEZA [[id number]]   child   Birth Weight: 0.705 kg (1 lb 8.9 oz)   Discharge fetal demise  Complications: No    Discharge Diagnosis:  Estelle Teixeira is a 24 y.o. female    32w4d  Delivered a Still Born male by Vaginal delivery.                                                                                                 Discharge Information/Patient Instructions:     Medication List        START taking these medications      ibuprofen 800 MG tablet  Commonly known as: ADVIL;MOTRIN  Take 1 tablet by mouth 2 times daily as needed for Pain            CONTINUE taking these medications      PRENATAL VITAMIN PO               Where to Get Your Medications        These medications were sent to [id number] - [place] -  [address] [place] [address] Drive [address] - P [phone] - F [phone]  [address] [place] [address] [address], [place] [address]      Phone: [phone]   ibuprofen 800 MG tablet         No discharge procedures on file.    Activity: activity as tolerated  Diet: regular diet  Wound Care: none needed    Labs:   Admission on 06/26/2024   Component Date Value Ref Range Status    aPTT 06/26/2024 38.0 (H)  26.0 - 36.2 sec Final    Fibrinogen 06/26/2024 398  238 - 505 mg/dL Final    WBC 06/26/2024 8.0  4.8 - 10.8 K/uL Final    RBC 06/26/2024 4.14 (L)  4.20 - 5.40 M/uL Final    Hemoglobin 06/26/2024 9.6 (L)  12.0 - 16.0 g/dL Final    Hematocrit 06/26/2024 37.3  37.0 - 47.0 % Final    MCV 06/26/2024 90.1  81.0 - 99.0 fL Final    MCH 06/26/2024 23.2 (L)  27.0 - 31.0 pg Final    MCHC 06/26/2024 25.7 (L)  33.0 - 37.0 g/dL Final    RDW 06/26/2024 16.1 (H)  11.5 - 14.5 % Final    Platelets 06/26/2024 242  130 - 400 K/uL Final    MPV 06/26/2024 12.3  9.4 - 12.3 fL Final    ABO/Rh 06/26/2024 B POS   Final    Antibody Screen 06/26/2024 NEG   Final   ]      Discharge to: Home  Follow up in 2 weeks with dr Hawa LYLE      Discharge Date: 6/27/2024      Ryan Lagos MD      Comments: maternal condition  is stable and improving    Home care, Follow-up care and birth control were reviewed.  Infant sleeping, \"back to sleep\" and avoidance of co-sleeping recommendations were reviewed.  Signs and symptoms of mastitis and Post Partum Depression were reviewed.  The patient is to notify her physician if any of these occur. The patient was counseled on secondary smoke risks and the increased risk of sudden infant death syndrome and respiratory problems to her baby with exposure. She was counseled on various alternate recommendations to decrease the exposure to secondary smoke to her children.

## 2024-06-27 NOTE — CARE COORDINATION
Tierney provided Pt with written documentation for community resources based on the Pt SDOH scores or any additional community resources as needed. These resources will be provided at D/C for the Pt. Electronically signed by Tierney Menezes on 6/27/2024 at 6:23 AM

## 2024-07-17 ENCOUNTER — TELEPHONE (OUTPATIENT)
Dept: OBGYN CLINIC | Age: 24
End: 2024-07-17

## 2024-07-17 NOTE — TELEPHONE ENCOUNTER
[facility] [place] [facility] the office to schedule a 6 week postpartum visit for the patient. Patient requested to see Dr. Shaikh in our office to discuss birth control. Scheduled appt for 8/12/2024.

## 2025-04-10 ENCOUNTER — HOSPITAL ENCOUNTER (EMERGENCY)
Facility: HOSPITAL | Age: 25
Discharge: HOME | End: 2025-04-10
Attending: EMERGENCY MEDICINE
Payer: COMMERCIAL

## 2025-04-10 VITALS
OXYGEN SATURATION: 99 % | WEIGHT: 128 LBS | HEIGHT: 65 IN | BODY MASS INDEX: 21.33 KG/M2 | RESPIRATION RATE: 16 BRPM | SYSTOLIC BLOOD PRESSURE: 113 MMHG | HEART RATE: 77 BPM | TEMPERATURE: 98.3 F | DIASTOLIC BLOOD PRESSURE: 65 MMHG

## 2025-04-10 DIAGNOSIS — J02.0 STREP THROAT: Primary | ICD-10-CM

## 2025-04-10 LAB — S PYO AG THROAT QL: POSITIVE

## 2025-04-10 PROCEDURE — 25010000002 DEXAMETHASONE PER 1 MG: Performed by: EMERGENCY MEDICINE

## 2025-04-10 PROCEDURE — 99283 EMERGENCY DEPT VISIT LOW MDM: CPT

## 2025-04-10 PROCEDURE — 87880 STREP A ASSAY W/OPTIC: CPT | Performed by: EMERGENCY MEDICINE

## 2025-04-10 RX ORDER — PENICILLIN V POTASSIUM 500 MG/1
500 TABLET, FILM COATED ORAL 3 TIMES DAILY
Qty: 21 TABLET | Refills: 0 | Status: SHIPPED | OUTPATIENT
Start: 2025-04-10 | End: 2025-04-17

## 2025-04-10 RX ORDER — PENICILLIN V POTASSIUM 500 MG/1
500 TABLET, FILM COATED ORAL ONCE
Status: COMPLETED | OUTPATIENT
Start: 2025-04-10 | End: 2025-04-10

## 2025-04-10 RX ADMIN — DEXAMETHASONE SODIUM PHOSPHATE 10 MG: 10 INJECTION INTRAMUSCULAR; INTRAVENOUS at 21:19

## 2025-04-10 RX ADMIN — PENICILLIN V POTASSIUM 500 MG: 500 TABLET, FILM COATED ORAL at 20:58

## 2025-04-11 NOTE — ED PROVIDER NOTES
Subjective   History of Present Illness  Pt presents to the ED with report of sore throat and pain with swallowing for the past 2d.  No n/v/f/c. No cough/congestion.  No injuries/sick contacts.  No voice changes.          Review of Systems   Constitutional:  Negative for chills and fever.   HENT:  Positive for sore throat and trouble swallowing. Negative for congestion and voice change.    Respiratory:  Negative for cough and shortness of breath.    Cardiovascular:  Negative for chest pain.   Gastrointestinal:  Negative for abdominal pain, nausea and vomiting.   Skin:  Negative for rash.   All other systems reviewed and are negative.      Past Medical History:   Diagnosis Date    Chlamydia     during this pregnancy    Gonorrhea     during this pregnancy       No Known Allergies    No past surgical history on file.    Family History   Problem Relation Age of Onset    Lung disease Mother     No Known Problems Father     Lung disease Brother     Kidney failure Maternal Grandmother     Cancer Maternal Grandfather     Diabetes Paternal Grandmother     Lung disease Paternal Grandfather        Social History     Socioeconomic History    Marital status: Single   Tobacco Use    Smoking status: Never    Smokeless tobacco: Never   Vaping Use    Vaping status: Former   Substance and Sexual Activity    Alcohol use: No    Drug use: No    Sexual activity: Never           Objective   Physical Exam  Vitals and nursing note reviewed.   Constitutional:       General: She is not in acute distress.  HENT:      Head: Normocephalic and atraumatic.      Nose: No congestion.      Mouth/Throat:      Mouth: Mucous membranes are moist.      Pharynx: Posterior oropharyngeal erythema present. No pharyngeal swelling or oropharyngeal exudate.      Tonsils: No tonsillar exudate or tonsillar abscesses.   Eyes:      Conjunctiva/sclera: Conjunctivae normal.   Cardiovascular:      Rate and Rhythm: Normal rate and regular rhythm.      Heart sounds:  Normal heart sounds.   Pulmonary:      Effort: Pulmonary effort is normal.      Breath sounds: Normal breath sounds.   Lymphadenopathy:      Cervical: Cervical adenopathy present.   Skin:     General: Skin is warm and dry.      Capillary Refill: Capillary refill takes less than 2 seconds.   Neurological:      Mental Status: She is alert.         Procedures           ED Course      Labs Reviewed   RAPID STREP A SCREEN - Abnormal; Notable for the following components:       Result Value    Strep A Ag Positive (*)     All other components within normal limits                                                        Medical Decision Making  Pt stable in ED - NAD att. PO2 nml @ 100% on RA.  + strep.  No evid of PTA/RPA.  No evid of SBI/sepsis.  Given decadron/Pen vk in ED and will d/c with pen vk - recommend prn apap/nsaids and outpt f/u    Risk  Prescription drug management.        Final diagnoses:   Strep throat       ED Disposition  ED Disposition       ED Disposition   Discharge    Condition   Stable    Comment   --               Provider, No Known  [facility]  Elliot HERNANDEZ [id number] [phone]               Medication List        New Prescriptions      penicillin v potassium 500 MG tablet  Commonly known as: VEETID  Take 1 tablet by mouth 3 (Three) Times a Day for 7 days.               Where to Get Your Medications        These medications were sent to [facility]/pharmacy #[id number] - [place], [place] - [address] AT [address]OSS FROM [address] - [phone]  - [phone]   [address] [place] [address]      Phone: [phone]   penicillin v potassium 500 MG tablet            Marv Martin,   04/10/25 2023       Marv Martin DO  04/10/25 2053

## 2025-07-16 ENCOUNTER — HOSPITAL ENCOUNTER (EMERGENCY)
Facility: HOSPITAL | Age: 25
Discharge: HOME | End: 2025-07-16
Admitting: EMERGENCY MEDICINE
Payer: COMMERCIAL

## 2025-07-16 VITALS
HEIGHT: 65 IN | RESPIRATION RATE: 16 BRPM | SYSTOLIC BLOOD PRESSURE: 128 MMHG | OXYGEN SATURATION: 100 % | HEART RATE: 118 BPM | BODY MASS INDEX: 22.57 KG/M2 | WEIGHT: 135.5 LBS | TEMPERATURE: 98.9 F | DIASTOLIC BLOOD PRESSURE: 80 MMHG

## 2025-07-16 DIAGNOSIS — Z32.00 POSSIBLE PREGNANCY, NOT YET CONFIRMED: Primary | ICD-10-CM

## 2025-07-16 PROCEDURE — 99282 EMERGENCY DEPT VISIT SF MDM: CPT

## 2025-07-16 NOTE — DISCHARGE INSTRUCTIONS
Schedule follow-up appointment with OB to determine how far along you are in this pregnancy and for further care.  Return to ED with any further concerns, symptoms, or as needed.

## 2025-07-16 NOTE — ED PROVIDER NOTES
Subjective   History of Present Illness  Eva Amaya is a 25-year-old female with no significant past medical history who presents to ED today for possible pregnancy.  Patient states that she took 3 home pregnancy test that were all positive and came in because she wanted to see how far along she is.  Notes that her last menstrual period was June 16, 2025.  States that she is not having any vaginal bleeding, cramping, discharge, dysuria, abdominal pain, nausea/vomiting.  Notes that this would be her third pregnancy and she has 1 living child and had 1 stillborn child.    History provided by:  Patient   used: No        Review of Systems   Constitutional:  Negative for activity change, chills and fever.   Eyes:  Negative for visual disturbance.   Respiratory:  Negative for cough, chest tightness and shortness of breath.    Cardiovascular:  Negative for chest pain and palpitations.   Gastrointestinal:  Negative for abdominal distention, abdominal pain, diarrhea, nausea and vomiting.   Genitourinary:  Negative for dysuria, genital sores, pelvic pain, vaginal bleeding, vaginal discharge and vaginal pain.   Musculoskeletal:  Negative for arthralgias.   Skin:  Negative for color change, rash and wound.   Neurological:  Negative for dizziness, weakness, numbness and headaches.   Psychiatric/Behavioral:  Negative for confusion.        Past Medical History:   Diagnosis Date    Chlamydia     during this pregnancy    Gonorrhea     during this pregnancy       No Known Allergies    No past surgical history on file.    Family History   Problem Relation Age of Onset    Lung disease Mother     No Known Problems Father     Lung disease Brother     Kidney failure Maternal Grandmother     Cancer Maternal Grandfather     Diabetes Paternal Grandmother     Lung disease Paternal Grandfather        Social History     Socioeconomic History    Marital status: Single   Tobacco Use    Smoking status: Never    Smokeless  tobacco: Never   Vaping Use    Vaping status: Former   Substance and Sexual Activity    Alcohol use: No    Drug use: No    Sexual activity: Never           Objective   Physical Exam  Vitals and nursing note reviewed.   Constitutional:       General: She is not in acute distress.     Appearance: Normal appearance. She is not ill-appearing or toxic-appearing.   HENT:      Head: Normocephalic and atraumatic.      Nose: Nose normal.   Cardiovascular:      Rate and Rhythm: Normal rate and regular rhythm.      Pulses: Normal pulses.   Pulmonary:      Effort: Pulmonary effort is normal.      Breath sounds: Normal breath sounds.   Abdominal:      General: Bowel sounds are normal. There is no distension.   Musculoskeletal:         General: Normal range of motion.      Cervical back: Neck supple.   Skin:     General: Skin is warm and dry.      Capillary Refill: Capillary refill takes less than 2 seconds.   Neurological:      General: No focal deficit present.      Mental Status: She is alert and oriented to person, place, and time.   Psychiatric:         Mood and Affect: Mood normal.         Behavior: Behavior normal.         Procedures           ED Course                                                       Medical Decision Making  In summary, patient presents to ED today for possible pregnancy.  On arrival, patient is ambulatory, afebrile, and normotensive.  No labs or imaging are indicated at this time.  Physical exam benign.  Advised patient that unless she is having a complication or concern about pregnancy we do not do routine ultrasounds blood work in the ED for healthy pregnancies.  Advised patient she would need to follow-up with her OB/GYN for further management of care and confirmation of pregnancy.  Patient agreeable this plan of care.    Problems Addressed:  Possible pregnancy, not yet confirmed: acute illness or injury        Final diagnoses:   Possible pregnancy, not yet confirmed       ED Disposition  ED  Disposition       ED Disposition   Discharge    Condition   Stable    Comment   --               OB  as scheduled             Medication List      No changes were made to your prescriptions during this visit.            Klaudia Miranda, APRN  07/16/25 1879

## (undated) DEVICE — TOWEL,OR,DSP,ST,BLUE,DLX,4/PK,20PK/CS: Brand: MEDLINE

## (undated) DEVICE — TUBE ET 7MM NSL ORAL BASIC CUF INTMED MURPHY EYE RADPQ MRK

## (undated) DEVICE — HOSE CONN L18IN UTER DISP FOR BERK SAFETOUCH SYS

## (undated) DEVICE — GLOVE SURG SZ 85 CRM LTX FREE POLYISOPRENE POLYMER BEAD ANTI

## (undated) DEVICE — SURGICAL PROCEDURE PACK GYNECOLOGIC MIN LOURDES HOSP

## (undated) DEVICE — U.V.A.C. SWIVEL HANDLE W/TUBING, 6': Brand: CONVERTORS

## (undated) DEVICE — CURAVIEW LED LARYNGOSCOPE BLADE & HANDLE,DISPOSABLE,MILLER 2: Brand: CURAPLEX

## (undated) DEVICE — SYSTEM COLL W/ TISS TRAP INCLUDE COLL CANSTR LID SET OF

## (undated) DEVICE — GLOVE SURG SZ 8 CRM LTX FREE POLYISOPRENE POLYMER BEAD ANTI

## (undated) DEVICE — Device